# Patient Record
Sex: MALE | Race: AMERICAN INDIAN OR ALASKA NATIVE
[De-identification: names, ages, dates, MRNs, and addresses within clinical notes are randomized per-mention and may not be internally consistent; named-entity substitution may affect disease eponyms.]

---

## 2019-10-09 ENCOUNTER — HOSPITAL ENCOUNTER (INPATIENT)
Dept: HOSPITAL 5 - ED | Age: 29
LOS: 7 days | Discharge: HOME | DRG: 812 | End: 2019-10-16
Attending: INTERNAL MEDICINE | Admitting: INTERNAL MEDICINE
Payer: MEDICARE

## 2019-10-09 DIAGNOSIS — Z90.49: ICD-10-CM

## 2019-10-09 DIAGNOSIS — E86.0: ICD-10-CM

## 2019-10-09 DIAGNOSIS — D57.00: Primary | ICD-10-CM

## 2019-10-09 DIAGNOSIS — Z79.899: ICD-10-CM

## 2019-10-09 DIAGNOSIS — G89.29: ICD-10-CM

## 2019-10-09 DIAGNOSIS — R09.02: ICD-10-CM

## 2019-10-09 DIAGNOSIS — D64.9: ICD-10-CM

## 2019-10-09 DIAGNOSIS — Z86.718: ICD-10-CM

## 2019-10-09 DIAGNOSIS — J45.909: ICD-10-CM

## 2019-10-09 LAB
ANISOCYTOSIS BLD QL SMEAR: (no result)
BAND NEUTROPHILS # (MANUAL): 0 K/MM3
HCT VFR BLD CALC: 21.4 % (ref 35.5–45.6)
HGB BLD-MCNC: 7.8 GM/DL (ref 11.8–15.2)
HGB S BLD QL SOLY: (no result)
INR PPP: 1.11 (ref 0.87–1.13)
MACROCYTES BLD QL SMEAR: (no result)
MCHC RBC AUTO-ENTMCNC: 36 % (ref 32–34)
MCV RBC AUTO: 96 FL (ref 84–94)
MYELOCYTES # (MANUAL): 0 K/MM3
PLATELET # BLD: 197 K/MM3 (ref 140–440)
PROMYELOCYTES # (MANUAL): 0 K/MM3
RBC # BLD AUTO: 2.23 M/MM3 (ref 3.65–5.03)
TOTAL CELLS COUNTED BLD: 100

## 2019-10-09 PROCEDURE — 85007 BL SMEAR W/DIFF WBC COUNT: CPT

## 2019-10-09 PROCEDURE — 85660 RBC SICKLE CELL TEST: CPT

## 2019-10-09 PROCEDURE — 86850 RBC ANTIBODY SCREEN: CPT

## 2019-10-09 PROCEDURE — 94760 N-INVAS EAR/PLS OXIMETRY 1: CPT

## 2019-10-09 PROCEDURE — 85018 HEMOGLOBIN: CPT

## 2019-10-09 PROCEDURE — 86920 COMPATIBILITY TEST SPIN: CPT

## 2019-10-09 PROCEDURE — 36415 COLL VENOUS BLD VENIPUNCTURE: CPT

## 2019-10-09 PROCEDURE — 86901 BLOOD TYPING SEROLOGIC RH(D): CPT

## 2019-10-09 PROCEDURE — 85610 PROTHROMBIN TIME: CPT

## 2019-10-09 PROCEDURE — 85014 HEMATOCRIT: CPT

## 2019-10-09 PROCEDURE — 85045 AUTOMATED RETICULOCYTE COUNT: CPT

## 2019-10-09 PROCEDURE — 83615 LACTATE (LD) (LDH) ENZYME: CPT

## 2019-10-09 PROCEDURE — 83550 IRON BINDING TEST: CPT

## 2019-10-09 PROCEDURE — A6250 SKIN SEAL PROTECT MOISTURIZR: HCPCS

## 2019-10-09 PROCEDURE — G0378 HOSPITAL OBSERVATION PER HR: HCPCS

## 2019-10-09 PROCEDURE — 82728 ASSAY OF FERRITIN: CPT

## 2019-10-09 PROCEDURE — 86900 BLOOD TYPING SEROLOGIC ABO: CPT

## 2019-10-09 PROCEDURE — 87116 MYCOBACTERIA CULTURE: CPT

## 2019-10-09 PROCEDURE — 96374 THER/PROPH/DIAG INJ IV PUSH: CPT

## 2019-10-09 PROCEDURE — 85025 COMPLETE CBC W/AUTO DIFF WBC: CPT

## 2019-10-09 PROCEDURE — 80048 BASIC METABOLIC PNL TOTAL CA: CPT

## 2019-10-09 PROCEDURE — 82150 ASSAY OF AMYLASE: CPT

## 2019-10-09 PROCEDURE — P9016 RBC LEUKOCYTES REDUCED: HCPCS

## 2019-10-09 PROCEDURE — 83690 ASSAY OF LIPASE: CPT

## 2019-10-09 RX ADMIN — DEXTROSE AND SODIUM CHLORIDE SCH MLS/HR: 5; .2 INJECTION, SOLUTION INTRAVENOUS at 21:14

## 2019-10-09 RX ADMIN — DIPHENHYDRAMINE HYDROCHLORIDE PRN MG: 50 INJECTION, SOLUTION INTRAMUSCULAR; INTRAVENOUS at 21:20

## 2019-10-09 RX ADMIN — HYDROMORPHONE HYDROCHLORIDE SCH MG: 2 INJECTION, SOLUTION INTRAMUSCULAR; INTRAVENOUS; SUBCUTANEOUS at 21:18

## 2019-10-09 RX ADMIN — APIXABAN SCH MG: 2.5 TABLET, FILM COATED ORAL at 21:17

## 2019-10-09 RX ADMIN — KETOROLAC TROMETHAMINE SCH MG: 30 INJECTION, SOLUTION INTRAMUSCULAR at 21:19

## 2019-10-09 NOTE — EMERGENCY DEPARTMENT REPORT
ED General Adult HPI





- General


Chief complaint: Sickle Cell Crisis


Stated complaint: SICKLE CELL CRISIS


Time Seen by Provider: 10/09/19 12:39


Source: patient


Mode of arrival: Ambulatory


Limitations: No Limitations





- History of Present Illness


Initial comments: 





She presents to the emergency pole with a chief complaint of sickle cell crisis.

 Patient states he is having sharp back and leg pain is consistent with prior 

crises.  Patient denies chest pain, shortness breath, or headache.


-: Gradual


Severity scale (0 -10): 5


Quality: sharp


Consistency: constant


Improves with: rest


Worsens with: movement


Associated Symptoms: denies other symptoms


Treatments Prior to Arrival: none





- Related Data


                                Home Medications











 Medication  Instructions  Recorded  Confirmed  Last Taken


 


Folic Acid [Folvite] 1 mg PO DAILY 06/21/18 09/11/19 09/10/19


 


Morphine ER [Ms Contin ER] 30 mg PO BID 06/21/18 09/11/19 09/10/19


 


Zolpidem [Ambien] 5 mg PO QHS PRN 08/12/19 09/11/19 09/10/19








                                  Previous Rx's











 Medication  Instructions  Recorded  Last Taken  Type


 


Hydroxyurea [Droxia] 1,500 mg PO DAILY #30 capsule 02/24/18 09/10/19 Rx


 


Oxycodone HCl/Acetaminophen 1 tab PO Q4-6H PRN #6 tablet 05/05/19 09/10/19 Rx





[Percocet 10/325 mg]    


 


Apixaban [Eliquis] 2.5 mg PO BID #60 tablet 06/10/19 09/10/19 Rx











                                    Allergies











Allergy/AdvReac Type Severity Reaction Status Date / Time


 


No Known Allergies Allergy   Verified 08/12/19 14:45














ED Review of Systems


ROS: 


Stated complaint: SICKLE CELL CRISIS


Other details as noted in HPI





Comment: All other systems reviewed and negative


Constitutional: denies: chills, fever


Eyes: denies: eye pain, eye discharge, vision change


ENT: denies: ear pain, throat pain


Respiratory: denies: cough, shortness of breath, wheezing


Cardiovascular: denies: chest pain, palpitations


Endocrine: no symptoms reported


Gastrointestinal: denies: abdominal pain, nausea, diarrhea


Genitourinary: denies: urgency, dysuria


Musculoskeletal: denies: back pain, joint swelling, arthralgia


Skin: denies: rash, lesions


Neurological: denies: headache, weakness, paresthesias


Psychiatric: denies: anxiety, depression


Hematological/Lymphatic: denies: easy bleeding, easy bruising





ED Past Medical Hx





- Past Medical History


Previous Medical History?: Yes


Hx Hypertension: No


Hx CVA: No


Hx Heart Attack/AMI: No


Hx Congestive Heart Failure: No


Hx Diabetes: No


Hx Deep Vein Thrombosis: No


Hx Pulmonary Embolism: No


Hx GERD: No


Hx Liver Disease: No


Hx Renal Disease: No


Hx of Cancer: No


Hx Sickle Cell Disease: Yes


Hx Arthritis: No


Hx Headaches / Migraines: No


Hx Seizures: No


Hx Kidney Stones: No


Hx Psychiatric Treatment: No


Hx Asthma: Yes (denies hx exacerbations)


Hx COPD: No


Hx Tuberculosis: No


Hx Dementia: No


Hx HIV: No


Additional medical history: Acute chest syndrome,





- Surgical History


Past Surgical History?: Yes


Hx Coronary Stent: No


Hx Open Heart Surgery: No


Hx Pacemaker: No


Hx Internal Defibrillator: No


Hx Cholecystectomy: Yes


Hx Appendectomy: No


Hx Breast Surgery: No


Additional Surgical History: TRACHEOSTOMY.  2 PORTS AND REMOVAL.  LIVER BIOPSY 

X2





- Social History


Smoking Status: Never Smoker


Substance Use Type: None





- Medications


Home Medications: 


                                Home Medications











 Medication  Instructions  Recorded  Confirmed  Last Taken  Type


 


Hydroxyurea [Droxia] 1,500 mg PO DAILY #30 capsule 02/24/18 09/11/19 09/10/19 Rx


 


Folic Acid [Folvite] 1 mg PO DAILY 06/21/18 09/11/19 09/10/19 History


 


Morphine ER [Ms Contin ER] 30 mg PO BID 06/21/18 09/11/19 09/10/19 History


 


Oxycodone HCl/Acetaminophen 1 tab PO Q4-6H PRN #6 tablet 05/05/19 09/11/19 

09/10/19 Rx





[Percocet 10/325 mg]     


 


Apixaban [Eliquis] 2.5 mg PO BID #60 tablet 06/10/19 09/11/19 09/10/19 Rx


 


Zolpidem [Ambien] 5 mg PO QHS PRN 08/12/19 09/11/19 09/10/19 History














ED Physical Exam





- General


Limitations: No Limitations


General appearance: alert, in no apparent distress





- Head


Head exam: Present: atraumatic, normocephalic





- Eye


Eye exam: Present: PERRL, EOMI, scleral icterus





- ENT


ENT exam: Present: mucous membranes moist





- Neck


Neck exam: Present: normal inspection





- Respiratory


Respiratory exam: Present: normal lung sounds bilaterally.  Absent: respiratory 

distress





- Cardiovascular


Cardiovascular Exam: Present: regular rate, normal rhythm.  Absent: systolic 

murmur, diastolic murmur, rubs, gallop





- GI/Abdominal


GI/Abdominal exam: Present: soft, normal bowel sounds.  Absent: distended, 

tenderness





- Rectal


Rectal exam: Present: deferred





- Extremities Exam


Extremities exam: Present: normal inspection





- Back Exam


Back exam: Present: normal inspection





- Neurological Exam


Neurological exam: Present: alert, oriented X3, CN II-XII intact.  Absent: motor

 sensory deficit





- Psychiatric


Psychiatric exam: Present: normal affect, normal mood





- Skin


Skin exam: Present: warm, dry, intact, normal color.  Absent: rash





ED Course





                                   Vital Signs











  10/09/19 10/09/19 10/09/19





  12:22 12:52 15:59


 


Temperature 98.7 F  


 


Pulse Rate 102 H 96 H 89


 


Respiratory 16 16 16





Rate   


 


Blood Pressure 121/74  


 


Blood Pressure  121/70 102/57





[Left]   


 


O2 Sat by Pulse 90 100 96





Oximetry   














  10/09/19 10/09/19





  17:03 17:35


 


Temperature  


 


Pulse Rate 86 


 


Respiratory 20 





Rate  


 


Blood Pressure  


 


Blood Pressure 122/67 118/76





[Left]  


 


O2 Sat by Pulse 100 





Oximetry  














ED Medical Decision Making





- Lab Data


Result diagrams: 


                                 10/09/19 14:48











                                   Lab Results











  10/09/19 10/09/19 10/09/19 Range/Units





  14:48 14:48 14:48 


 


RBC  2.23 L    (3.65-5.03)  M/mm3


 


Hgb  7.8 L    (11.8-15.2)  gm/dl


 


Hct  21.4 L    (35.5-45.6)  %


 


MCV  96 H    (84-94)  fl


 


MCH  35 H    (28-32)  pg


 


MCHC  36 H    (32-34)  %


 


RDW  31.0 H    (13.2-15.2)  %


 


Percent Retic  17.52 H    (0.78-2.58)  %


 


PT    14.0  (12.2-14.9)  Sec.


 


INR    1.11  (0.87-1.13)  


 


Lactate Dehydrogenase     ()  units/L


 


Amylase   67   ()  units/L


 


Lipase   24   (13-60)  units/L


 


Blood Type     


 


Antibody Screen     














  10/09/19 10/09/19 Range/Units





  14:48 14:50 


 


RBC    (3.65-5.03)  M/mm3


 


Hgb    (11.8-15.2)  gm/dl


 


Hct    (35.5-45.6)  %


 


MCV    (84-94)  fl


 


MCH    (28-32)  pg


 


MCHC    (32-34)  %


 


RDW    (13.2-15.2)  %


 


Percent Retic    (0.78-2.58)  %


 


PT    (12.2-14.9)  Sec.


 


INR    (0.87-1.13)  


 


Lactate Dehydrogenase  1434 H   ()  units/L


 


Amylase    ()  units/L


 


Lipase    (13-60)  units/L


 


Blood Type   A POSITIVE  


 


Antibody Screen   Negative  














- Medical Decision Making





Patient received IV Dilaudid and IV fluids


Critical care attestation.: 


If time is entered above; I have spent that time in minutes in the direct care 

of this critically ill patient, excluding procedure time.








ED Disposition


Clinical Impression: 


 Sickle cell crisis





Disposition: DC-09 OP ADMIT IP TO THIS HOSP


Is pt being admited?: Yes


Does the pt Need Aspirin: No


Condition: Fair


Referrals: 


DARLENE KIM DO [Primary Care Provider] - 3-5 Days

## 2019-10-09 NOTE — HISTORY AND PHYSICAL REPORT
History of Present Illness


Date of examination: 10/09/19


Date of admission: 


t/10/9/19


Chief complaint: 


SCD/Pain crisis





History of present illness: 


Patient presented to the Er with CC of SCD/Pain crisis, not controlled by his 

home meds. he was evaluated, in the ED, and admitted for sxs 

management/control.He will get pain . hydration, oxygen, and monitor labs.








Past History


Past Medical History: anemia, DVT


Social history: no significant social history, single, lives with family


Family history: no significant family history





Medications and Allergies


                                    Allergies











Allergy/AdvReac Type Severity Reaction Status Date / Time


 


No Known Allergies Allergy   Verified 08/12/19 14:45











                                Home Medications











 Medication  Instructions  Recorded  Confirmed  Last Taken  Type


 


Hydroxyurea [Droxia] 1,500 mg PO DAILY #30 capsule 02/24/18 09/11/19 09/10/19 Rx


 


Folic Acid [Folvite] 1 mg PO DAILY 06/21/18 09/11/19 09/10/19 History


 


Morphine ER [Ms Contin ER] 30 mg PO BID 06/21/18 09/11/19 09/10/19 History


 


Oxycodone HCl/Acetaminophen 1 tab PO Q4-6H PRN #6 tablet 05/05/19 09/11/19 

09/10/19 Rx





[Percocet 10/325 mg]     


 


Apixaban [Eliquis] 2.5 mg PO BID #60 tablet 06/10/19 09/11/19 09/10/19 Rx


 


Zolpidem [Ambien] 5 mg PO QHS PRN 08/12/19 09/11/19 09/10/19 History











Active Meds: 


Active Medications





Apixaban (Eliquis)  2.5 mg PO BID DULCE; Protocol


Diphenhydramine HCl (Benadryl)  25 mg IV Q6HR ONE


   Stop: 10/09/19 18:42


Folic Acid (Folvite)  1 mg PO DAILY DULCE


Hydromorphone HCl (Dilaudid)  2 mg IV Q3HR DULCE


Sodium Chloride (Nacl 0.9% 1000 Ml)  1,000 mls @ 150 mls/hr IV AS DIRECT DULCE


Dextrose/Sodium Chloride (D5ns 0.2%)  1,000 mls @ 150 mls/hr IV AS DIRECT DULCE


Ketorolac Tromethamine (Toradol)  30 mg IV Q6HR ONE


   Stop: 10/09/19 18:44


Miscellaneous Medication (Hydroxyurea [Droxia])  1,500 mg PO DAILY DULCE


Miscellaneous Medication (Oxycodone Hcl/Acetaminophen [Percocet 10/325 Mg])  1 

tab PO Q4-6H PRN


   PRN Reason: PAIN


Ondansetron HCl (Zofran)  4 mg IV Q8HR ONE


   Stop: 10/09/19 18:45


Zolpidem Tartrate (Ambien)  5 mg PO QHS PRN


   PRN Reason: Sleep











Review of Systems


Constitutional: fatigue, chronic pain


Respiratory: shortness of breath, home oxygen





Exam





- Constitutional


Vitals: 


                                        











Temp Pulse Resp BP Pulse Ox


 


 98.7 F   86   20   118/76   100 


 


 10/09/19 12:22  10/09/19 17:03  10/09/19 17:03  10/09/19 17:35  10/09/19 17:03











General appearance: Present: mild distress, well-nourished





- EENT


Eyes: Present: PERRL


ENT: hearing intact, clear oral mucosa





- Neck


Neck: Present: supple, normal ROM





- Respiratory


Respiratory: bilateral: diminished





- Cardiovascular


Heart Sounds: Present: S1 & S2.  Absent: rub, click





- Extremities


Extremities: pulses symmetrical, No edema


Peripheral Pulses: within normal limits





- Abdominal


General gastrointestinal: Present: soft, non-tender, non-distended, normal bowel

sounds


Male genitourinary: Present: deferred





- Rectal


Rectal Exam: deferred





- Integumentary


Integumentary: Present: clear, warm, dry





- Musculoskeletal


Musculoskeletal: gait normal, strength equal bilaterally





- Psychiatric


Psychiatric: appropriate mood/affect, intact judgment & insight





- Neurologic


Neurologic: CNII-XII intact, moves all extremities





Results





- Labs


CBC & Chem 7: 


                                 10/09/19 14:48





Labs: 


                              Abnormal lab results











  10/09/19 10/09/19 Range/Units





  14:48 14:48 


 


RBC  2.23 L   (3.65-5.03)  M/mm3


 


Hgb  7.8 L   (11.8-15.2)  gm/dl


 


Hct  21.4 L   (35.5-45.6)  %


 


MCV  96 H   (84-94)  fl


 


MCH  35 H   (28-32)  pg


 


MCHC  36 H   (32-34)  %


 


RDW  31.0 H   (13.2-15.2)  %


 


Monocytes % (Manual)  13.0 H   (0.0-7.3)  %


 


Nucleated RBC %  34.0 H   (0.0-0.9)  %


 


Seg Neutrophils # Man  0.0 L   (1.8-7.7)  K/mm3


 


Lymphocytes # (Manual)  0.0 L   (1.2-5.4)  K/mm3


 


Percent Retic  17.52 H   (0.78-2.58)  %


 


Lactate Dehydrogenase   1434 H  ()  units/L














Assessment and Plan





- Patient Problems


(1) Sickle cell anemia with pain


Current Visit: Yes   Status: Chronic   


Plan to address problem: 


see orders.








(2) Hemolytic crisis


Current Visit: No   Status: Acute   


Plan to address problem: 


supportive., and follow labs.








(3) Anemia


Current Visit: Yes   Status: Acute   


Plan to address problem: 


monitor, adjust labs.








(4) Dehydration


Current Visit: Yes   Status: Acute   


Plan to address problem: 


hydration.








(5) Hypoxemia


Current Visit: Yes   Status: Chronic   


Plan to address problem: 


oxygen

## 2019-10-09 NOTE — EVENT NOTE
ED Screening Note


Date of service: 10/09/19


Time: 12:39


ED Screening Note: 





28 y/o male comes in for SCD crisis.  Having abd pain and jaundice.  








This initial assessment/diagnostic orders/clinical plan/treatment(s) is/are 

subject to change based on patients health status, clinical progression and re-

assessment by fellow clinical providers in the ED. Further treatment and workup 

at subsequent clinical providers discretion. Patient/guardian urged not to elope

from the ED as their condition may be serious if not clinically assessed and 

managed. 





Initial orders include:

## 2019-10-10 LAB
ANISOCYTOSIS BLD QL SMEAR: (no result)
BAND NEUTROPHILS # (MANUAL): 0 K/MM3
BUN SERPL-MCNC: 9 MG/DL (ref 9–20)
BUN/CREAT SERPL: 13 %
CALCIUM SERPL-MCNC: 8 MG/DL (ref 8.4–10.2)
HCT VFR BLD CALC: 20.6 % (ref 35.5–45.6)
HEMOLYSIS INDEX: 16
HGB BLD-MCNC: 7.6 GM/DL (ref 11.8–15.2)
HGB S BLD QL SOLY: (no result)
IRON SERPL-MCNC: 141 UG/DL (ref 49–181)
MACROCYTES BLD QL SMEAR: (no result)
MCHC RBC AUTO-ENTMCNC: 37 % (ref 32–34)
MCV RBC AUTO: 96 FL (ref 84–94)
MYELOCYTES # (MANUAL): 0 K/MM3
PLATELET # BLD: 178 K/MM3 (ref 140–440)
PROMYELOCYTES # (MANUAL): 0 K/MM3
RBC # BLD AUTO: 2.16 M/MM3 (ref 3.65–5.03)
TIBC SERPL-MCNC: 167 MCG/DL (ref 250–450)
TOTAL CELLS COUNTED BLD: 100

## 2019-10-10 RX ADMIN — HYDROMORPHONE HYDROCHLORIDE SCH MG: 2 INJECTION, SOLUTION INTRAMUSCULAR; INTRAVENOUS; SUBCUTANEOUS at 15:46

## 2019-10-10 RX ADMIN — HYDROXYUREA SCH MG: 500 CAPSULE ORAL at 10:51

## 2019-10-10 RX ADMIN — HYDROMORPHONE HYDROCHLORIDE SCH MG: 2 INJECTION, SOLUTION INTRAMUSCULAR; INTRAVENOUS; SUBCUTANEOUS at 18:10

## 2019-10-10 RX ADMIN — FOLIC ACID SCH MG: 1 TABLET ORAL at 10:03

## 2019-10-10 RX ADMIN — HYDROMORPHONE HYDROCHLORIDE SCH MG: 2 INJECTION, SOLUTION INTRAMUSCULAR; INTRAVENOUS; SUBCUTANEOUS at 03:52

## 2019-10-10 RX ADMIN — HYDROMORPHONE HYDROCHLORIDE SCH MG: 2 INJECTION, SOLUTION INTRAMUSCULAR; INTRAVENOUS; SUBCUTANEOUS at 10:00

## 2019-10-10 RX ADMIN — DIPHENHYDRAMINE HYDROCHLORIDE PRN MG: 50 INJECTION, SOLUTION INTRAMUSCULAR; INTRAVENOUS at 10:09

## 2019-10-10 RX ADMIN — HYDROMORPHONE HYDROCHLORIDE SCH MG: 2 INJECTION, SOLUTION INTRAMUSCULAR; INTRAVENOUS; SUBCUTANEOUS at 06:42

## 2019-10-10 RX ADMIN — KETOROLAC TROMETHAMINE SCH: 30 INJECTION, SOLUTION INTRAMUSCULAR at 00:58

## 2019-10-10 RX ADMIN — DIPHENHYDRAMINE HYDROCHLORIDE PRN MG: 50 INJECTION, SOLUTION INTRAMUSCULAR; INTRAVENOUS at 03:55

## 2019-10-10 RX ADMIN — APIXABAN SCH MG: 2.5 TABLET, FILM COATED ORAL at 10:03

## 2019-10-10 RX ADMIN — DEXTROSE AND SODIUM CHLORIDE SCH MLS/HR: 5; .2 INJECTION, SOLUTION INTRAVENOUS at 03:52

## 2019-10-10 RX ADMIN — KETOROLAC TROMETHAMINE SCH MG: 30 INJECTION, SOLUTION INTRAMUSCULAR at 06:42

## 2019-10-10 RX ADMIN — HYDROMORPHONE HYDROCHLORIDE SCH MG: 2 INJECTION, SOLUTION INTRAMUSCULAR; INTRAVENOUS; SUBCUTANEOUS at 12:38

## 2019-10-10 RX ADMIN — HYDROMORPHONE HYDROCHLORIDE SCH MG: 2 INJECTION, SOLUTION INTRAMUSCULAR; INTRAVENOUS; SUBCUTANEOUS at 00:53

## 2019-10-10 RX ADMIN — DEXTROSE AND SODIUM CHLORIDE SCH MLS/HR: 5; .2 INJECTION, SOLUTION INTRAVENOUS at 10:12

## 2019-10-10 RX ADMIN — HYDROMORPHONE HYDROCHLORIDE SCH MG: 2 INJECTION, SOLUTION INTRAMUSCULAR; INTRAVENOUS; SUBCUTANEOUS at 21:43

## 2019-10-10 RX ADMIN — DIPHENHYDRAMINE HYDROCHLORIDE PRN MG: 50 INJECTION, SOLUTION INTRAMUSCULAR; INTRAVENOUS at 15:46

## 2019-10-10 RX ADMIN — DEXTROSE AND SODIUM CHLORIDE SCH MLS/HR: 5; .2 INJECTION, SOLUTION INTRAVENOUS at 17:03

## 2019-10-10 RX ADMIN — APIXABAN SCH MG: 2.5 TABLET, FILM COATED ORAL at 21:44

## 2019-10-10 RX ADMIN — DIPHENHYDRAMINE HYDROCHLORIDE PRN MG: 50 INJECTION, SOLUTION INTRAMUSCULAR; INTRAVENOUS at 21:44

## 2019-10-10 NOTE — PROGRESS NOTE
Assessment and Plan





- Patient Problems


(1) Sickle cell anemia with pain


Current Visit: Yes   Status: Chronic   


Plan to address problem: 


see orders.








(2) Hemolytic crisis


Current Visit: No   Status: Acute   


Plan to address problem: 


supportive., and follow labs.








(3) Anemia


Current Visit: Yes   Status: Acute   


Plan to address problem: 


monitor, adjust labs.








(4) Dehydration


Current Visit: Yes   Status: Acute   


Plan to address problem: 


hydration.








(5) Hypoxemia


Current Visit: Yes   Status: Chronic   


Plan to address problem: 


oxygen








Subjective


Date of service: 10/10/19


Principal diagnosis: SCD/Pain crisis, Anemia with hemolysis.


Interval history: 


Patient seen/examined, resting in bed, labs reviewed, case d/w patient. No new 

issues at this time. will continue with current management.








Objective





- Constitutional


Vitals: 


                               Vital Signs - 12hr











  10/10/19 10/10/19 10/10/19





  09:22 13:30 16:38


 


Temperature  97.8 F 97.6 F


 


Pulse Rate  68 68


 


Respiratory  24 20





Rate   


 


Blood Pressure  113/61 105/66


 


O2 Sat by Pulse 96 95 93





Oximetry   











General appearance: Present: mild distress, well-nourished





- EENT


Eyes: PERRL, EOM intact


ENT: hearing intact, clear oral mucosa


Ears: bilateral: normal





- Neck


Neck: supple, normal ROM





- Respiratory


Respiratory effort: normal


Respiratory: bilateral: CTA





- Breasts


Breasts: deferred





- Cardiovascular


Rhythm: regular


Heart Sounds: Present: S1 & S2.  Absent: gallop, rub


Extremities: pulses intact, No edema, normal color, Full ROM





- Gastrointestinal


General gastrointestinal: Present: soft, non-tender, non-distended, normal bowel

sounds


Rectal Exam: deferred





- Genitourinary


Male genitourinary: deferred





- Integumentary


Integumentary: clear, warm, dry





- Musculoskeletal


Musculoskeletal: 1, strength equal bilaterally





- Neurologic


Neurologic: moves all extremities





- Psychiatric


Psychiatric: memory intact, appropriate mood/affect, intact judgment & insight





- Labs


CBC & Chem 7: 


                                 10/10/19 07:00





                                 10/10/19 07:00


Labs: 


                              Abnormal lab results











  10/09/19 10/10/19 10/10/19 Range/Units





  14:42 07:00 07:00 


 


RBC   2.16 L   (3.65-5.03)  M/mm3


 


Hgb   7.6 L   (11.8-15.2)  gm/dl


 


Hct   20.6 L   (35.5-45.6)  %


 


MCV   96 H   (84-94)  fl


 


MCH   35 H   (28-32)  pg


 


MCHC   37 H   (32-34)  %


 


RDW   28.4 H   (13.2-15.2)  %


 


Seg Neuts % (Manual)   37.0 L   (40.0-70.0)  %


 


Lymphocytes % (Manual)   38.0 H   (13.4-35.0)  %


 


Monocytes % (Manual)   21.0 H   (0.0-7.3)  %


 


Nucleated RBC %   39.0 H   (0.0-0.9)  %


 


Seg Neutrophils # Man   0.0 L   (1.8-7.7)  K/mm3


 


Lymphocytes # (Manual)   0.0 L   (1.2-5.4)  K/mm3


 


Percent Retic   15.92 H   (0.78-2.58)  %


 


Sodium    135 L  (137-145)  mmol/L


 


Carbon Dioxide    19 L  (22-30)  mmol/L


 


Creatinine    0.7 L  (0.8-1.5)  mg/dL


 


Glucose    145 H  ()  mg/dL


 


Calcium    8.0 L  (8.4-10.2)  mg/dL


 


TIBC    167 L  (250-450)  mcg/dL


 


Ferritin  611.7 H    (13.0-400.0)  ng/mL

## 2019-10-11 RX ADMIN — HYDROMORPHONE HYDROCHLORIDE SCH MG: 2 INJECTION, SOLUTION INTRAMUSCULAR; INTRAVENOUS; SUBCUTANEOUS at 15:18

## 2019-10-11 RX ADMIN — DIPHENHYDRAMINE HYDROCHLORIDE PRN MG: 50 INJECTION, SOLUTION INTRAMUSCULAR; INTRAVENOUS at 03:40

## 2019-10-11 RX ADMIN — DEXTROSE AND SODIUM CHLORIDE SCH MLS/HR: 5; .2 INJECTION, SOLUTION INTRAVENOUS at 14:28

## 2019-10-11 RX ADMIN — DIPHENHYDRAMINE HYDROCHLORIDE PRN MG: 50 INJECTION, SOLUTION INTRAMUSCULAR; INTRAVENOUS at 18:17

## 2019-10-11 RX ADMIN — HYDROMORPHONE HYDROCHLORIDE SCH MG: 2 INJECTION, SOLUTION INTRAMUSCULAR; INTRAVENOUS; SUBCUTANEOUS at 03:37

## 2019-10-11 RX ADMIN — HYDROMORPHONE HYDROCHLORIDE SCH MG: 2 INJECTION, SOLUTION INTRAMUSCULAR; INTRAVENOUS; SUBCUTANEOUS at 06:39

## 2019-10-11 RX ADMIN — DEXTROSE AND SODIUM CHLORIDE SCH MLS/HR: 5; .2 INJECTION, SOLUTION INTRAVENOUS at 00:42

## 2019-10-11 RX ADMIN — HYDROMORPHONE HYDROCHLORIDE SCH MG: 2 INJECTION, SOLUTION INTRAMUSCULAR; INTRAVENOUS; SUBCUTANEOUS at 12:13

## 2019-10-11 RX ADMIN — FOLIC ACID SCH MG: 1 TABLET ORAL at 10:20

## 2019-10-11 RX ADMIN — HYDROMORPHONE HYDROCHLORIDE SCH MG: 2 INJECTION, SOLUTION INTRAMUSCULAR; INTRAVENOUS; SUBCUTANEOUS at 18:13

## 2019-10-11 RX ADMIN — DEXTROSE AND SODIUM CHLORIDE SCH MLS/HR: 5; .2 INJECTION, SOLUTION INTRAVENOUS at 21:14

## 2019-10-11 RX ADMIN — HYDROMORPHONE HYDROCHLORIDE SCH MG: 2 INJECTION, SOLUTION INTRAMUSCULAR; INTRAVENOUS; SUBCUTANEOUS at 09:02

## 2019-10-11 RX ADMIN — DEXTROSE AND SODIUM CHLORIDE SCH MLS/HR: 5; .2 INJECTION, SOLUTION INTRAVENOUS at 06:39

## 2019-10-11 RX ADMIN — HYDROMORPHONE HYDROCHLORIDE SCH MG: 2 INJECTION, SOLUTION INTRAMUSCULAR; INTRAVENOUS; SUBCUTANEOUS at 00:41

## 2019-10-11 RX ADMIN — HYDROXYUREA SCH MG: 500 CAPSULE ORAL at 10:19

## 2019-10-11 RX ADMIN — DIPHENHYDRAMINE HYDROCHLORIDE PRN MG: 50 INJECTION, SOLUTION INTRAMUSCULAR; INTRAVENOUS at 12:13

## 2019-10-11 RX ADMIN — APIXABAN SCH MG: 2.5 TABLET, FILM COATED ORAL at 21:13

## 2019-10-11 RX ADMIN — APIXABAN SCH MG: 2.5 TABLET, FILM COATED ORAL at 10:19

## 2019-10-11 RX ADMIN — HYDROMORPHONE HYDROCHLORIDE SCH MG: 2 INJECTION, SOLUTION INTRAMUSCULAR; INTRAVENOUS; SUBCUTANEOUS at 21:13

## 2019-10-11 NOTE — PROGRESS NOTE
Assessment and Plan





- Patient Problems


(1) Sickle cell anemia with pain


Current Visit: Yes   Status: Chronic   


Plan to address problem: 


see orders.








(2) Hemolytic crisis


Current Visit: No   Status: Acute   


Plan to address problem: 


supportive., and follow labs.








(3) Anemia


Current Visit: Yes   Status: Acute   


Plan to address problem: 


monitor, adjust labs.








(4) Dehydration


Current Visit: Yes   Status: Acute   


Plan to address problem: 


hydration.








(5) Hypoxemia


Current Visit: Yes   Status: Chronic   


Plan to address problem: 


oxygen








Subjective


Date of service: 10/11/19


Principal diagnosis: SCD/Pain crisis, Anemia with hemolysis.


Interval history: 


Patient seen/examined, resting in bed, labs reviewed, case d/w patient. No new 

issues at this time. will continue with current management.


Patient seen/examined, resting in bed, labs reviewed,  case d/w patient. Will 

continue with current management.





Objective





- Constitutional


Vitals: 


                               Vital Signs - 12hr











  10/10/19 10/11/19 10/11/19





  22:00 00:13 06:07


 


Temperature  98.3 F 97.7 F


 


Pulse Rate  74 64


 


Respiratory  16 16





Rate   


 


Blood Pressure  99/68 106/57


 


O2 Sat by Pulse 97 95 98





Oximetry   














  10/11/19





  09:17


 


Temperature 


 


Pulse Rate 


 


Respiratory 





Rate 


 


Blood Pressure 


 


O2 Sat by Pulse 100





Oximetry 











General appearance: Present: mild distress, well-nourished





- EENT


Eyes: PERRL, EOM intact


ENT: hearing intact, clear oral mucosa


Ears: bilateral: normal





- Neck


Neck: supple, normal ROM





- Respiratory


Respiratory: bilateral: diminished





- Breasts


Breasts: deferred





- Cardiovascular


Rhythm: regular


Heart Sounds: Present: S1 & S2.  Absent: gallop, rub


Extremities: pulses intact, No edema, normal color, Full ROM





- Gastrointestinal


General gastrointestinal: Present: soft, non-tender, non-distended, normal bowel

sounds


Rectal Exam: deferred





- Genitourinary


Male genitourinary: deferred





- Integumentary


Integumentary: clear, warm, dry





- Musculoskeletal


Musculoskeletal: 1, strength equal bilaterally





- Neurologic


Neurologic: moves all extremities





- Psychiatric


Psychiatric: memory intact, appropriate mood/affect, intact judgment & insight





- Labs


CBC & Chem 7: 


                                 10/10/19 07:00





                                 10/10/19 07:00


Labs: 


                              Abnormal lab results











  10/10/19 10/11/19 Range/Units





  07:00 07:00 


 


Seg Neuts % (Manual)  37.0 L   (40.0-70.0)  %


 


Lymphocytes % (Manual)  38.0 H   (13.4-35.0)  %


 


Monocytes % (Manual)  21.0 H   (0.0-7.3)  %


 


Nucleated RBC %  39.0 H   (0.0-0.9)  %


 


Seg Neutrophils # Man  0.0 L   (1.8-7.7)  K/mm3


 


Lymphocytes # (Manual)  0.0 L   (1.2-5.4)  K/mm3


 


Ferritin   556.7 H  (13.0-400.0)  ng/mL

## 2019-10-12 RX ADMIN — DEXTROSE AND SODIUM CHLORIDE SCH MLS/HR: 5; .2 INJECTION, SOLUTION INTRAVENOUS at 03:05

## 2019-10-12 RX ADMIN — HYDROMORPHONE HYDROCHLORIDE SCH MG: 2 INJECTION, SOLUTION INTRAMUSCULAR; INTRAVENOUS; SUBCUTANEOUS at 15:52

## 2019-10-12 RX ADMIN — HYDROMORPHONE HYDROCHLORIDE SCH MG: 2 INJECTION, SOLUTION INTRAMUSCULAR; INTRAVENOUS; SUBCUTANEOUS at 00:15

## 2019-10-12 RX ADMIN — HYDROXYUREA SCH MG: 500 CAPSULE ORAL at 09:13

## 2019-10-12 RX ADMIN — HYDROMORPHONE HYDROCHLORIDE SCH MG: 2 INJECTION, SOLUTION INTRAMUSCULAR; INTRAVENOUS; SUBCUTANEOUS at 09:07

## 2019-10-12 RX ADMIN — HYDROMORPHONE HYDROCHLORIDE SCH MG: 2 INJECTION, SOLUTION INTRAMUSCULAR; INTRAVENOUS; SUBCUTANEOUS at 21:30

## 2019-10-12 RX ADMIN — HYDROMORPHONE HYDROCHLORIDE SCH MG: 2 INJECTION, SOLUTION INTRAMUSCULAR; INTRAVENOUS; SUBCUTANEOUS at 18:46

## 2019-10-12 RX ADMIN — DIPHENHYDRAMINE HYDROCHLORIDE PRN MG: 50 INJECTION, SOLUTION INTRAMUSCULAR; INTRAVENOUS at 05:44

## 2019-10-12 RX ADMIN — DEXTROSE AND SODIUM CHLORIDE SCH MLS/HR: 5; .2 INJECTION, SOLUTION INTRAVENOUS at 18:52

## 2019-10-12 RX ADMIN — DIPHENHYDRAMINE HYDROCHLORIDE PRN MG: 50 INJECTION, SOLUTION INTRAMUSCULAR; INTRAVENOUS at 00:19

## 2019-10-12 RX ADMIN — HYDROMORPHONE HYDROCHLORIDE SCH MG: 2 INJECTION, SOLUTION INTRAMUSCULAR; INTRAVENOUS; SUBCUTANEOUS at 03:05

## 2019-10-12 RX ADMIN — HYDROMORPHONE HYDROCHLORIDE SCH MG: 2 INJECTION, SOLUTION INTRAMUSCULAR; INTRAVENOUS; SUBCUTANEOUS at 05:43

## 2019-10-12 RX ADMIN — APIXABAN SCH MG: 2.5 TABLET, FILM COATED ORAL at 21:29

## 2019-10-12 RX ADMIN — APIXABAN SCH MG: 2.5 TABLET, FILM COATED ORAL at 09:12

## 2019-10-12 RX ADMIN — DIPHENHYDRAMINE HYDROCHLORIDE PRN MG: 50 INJECTION, SOLUTION INTRAMUSCULAR; INTRAVENOUS at 18:47

## 2019-10-12 RX ADMIN — DIPHENHYDRAMINE HYDROCHLORIDE PRN MG: 50 INJECTION, SOLUTION INTRAMUSCULAR; INTRAVENOUS at 12:33

## 2019-10-12 RX ADMIN — HYDROMORPHONE HYDROCHLORIDE SCH MG: 2 INJECTION, SOLUTION INTRAMUSCULAR; INTRAVENOUS; SUBCUTANEOUS at 12:27

## 2019-10-12 RX ADMIN — FOLIC ACID SCH MG: 1 TABLET ORAL at 09:12

## 2019-10-12 NOTE — PROGRESS NOTE
Assessment and Plan





- Patient Problems


(1) Sickle cell anemia with pain


Current Visit: Yes   Status: Chronic   


Plan to address problem: 


see orders.








(2) Hemolytic crisis


Current Visit: No   Status: Acute   


Plan to address problem: 


supportive., and follow labs.








(3) Anemia


Current Visit: Yes   Status: Acute   


Plan to address problem: 


monitor, adjust labs.








(4) Dehydration


Current Visit: Yes   Status: Acute   


Plan to address problem: 


hydration.








(5) Hypoxemia


Current Visit: Yes   Status: Chronic   


Plan to address problem: 


oxygen








Subjective


Date of service: 10/12/19


Principal diagnosis: SCD/Pain crisis, Anemia with hemolysis.


Interval history: 


Patient seen/examined, resting in bed, labs reviewed, case d/w patient. No new 

issues at this time. will continue with current management.


Patient seen/examined, resting in bed, labs reviewed,  case d/w patient. Will 

continue with current management.


Patient seen/examined, resting in bed, labs reviewed, case d/w patient.Will 

continue to manage pain, and start d/c plans .





Objective





- Constitutional


Vitals: 


                               Vital Signs - 12hr











  10/12/19 10/12/19 10/12/19





  04:25 04:34 08:54


 


Temperature 97.9 F 98.2 F 


 


Pulse Rate 78 81 


 


Respiratory 17 17 





Rate   


 


Blood Pressure 105/68 113/69 


 


O2 Sat by Pulse 94 98 98





Oximetry   














  10/12/19





  12:06


 


Temperature 98.2 F


 


Pulse Rate 75


 


Respiratory 18





Rate 


 


Blood Pressure 121/64


 


O2 Sat by Pulse 94





Oximetry 











General appearance: Present: mild distress, well-nourished





- EENT


Eyes: PERRL, EOM intact


ENT: hearing intact, clear oral mucosa


Ears: bilateral: normal





- Neck


Neck: supple, normal ROM





- Respiratory


Respiratory: bilateral: diminished





- Breasts


Breasts: deferred





- Cardiovascular


Rhythm: regular


Heart Sounds: Present: S1 & S2.  Absent: gallop, rub


Extremities: pulses intact, No edema, normal color, Full ROM





- Gastrointestinal


General gastrointestinal: Present: soft, non-tender, non-distended, normal bowel

sounds


Rectal Exam: deferred





- Genitourinary


Male genitourinary: deferred





- Integumentary


Integumentary: clear, warm, dry





- Musculoskeletal


Musculoskeletal: 1, strength equal bilaterally





- Neurologic


Neurologic: moves all extremities





- Psychiatric


Psychiatric: memory intact, appropriate mood/affect, intact judgment & insight





- Labs


CBC & Chem 7: 


                                 10/10/19 07:00





                                 10/10/19 07:00

## 2019-10-13 LAB
BAND NEUTROPHILS # (MANUAL): 0 K/MM3
BUN SERPL-MCNC: 10 MG/DL (ref 9–20)
BUN/CREAT SERPL: 25 %
CALCIUM SERPL-MCNC: 8.5 MG/DL (ref 8.4–10.2)
HCT VFR BLD CALC: 18.8 % (ref 35.5–45.6)
HEMOLYSIS INDEX: 24
HGB BLD-MCNC: 6.9 GM/DL (ref 11.8–15.2)
HGB S BLD QL SOLY: (no result)
MCHC RBC AUTO-ENTMCNC: 37 % (ref 32–34)
MCV RBC AUTO: 92 FL (ref 84–94)
MYELOCYTES # (MANUAL): 0 K/MM3
PLATELET # BLD: 187 K/MM3 (ref 140–440)
PROMYELOCYTES # (MANUAL): 0 K/MM3
RBC # BLD AUTO: 2.05 M/MM3 (ref 3.65–5.03)
TARGETS BLD QL SMEAR: (no result)
TOTAL CELLS COUNTED BLD: 100

## 2019-10-13 PROCEDURE — 30233N1 TRANSFUSION OF NONAUTOLOGOUS RED BLOOD CELLS INTO PERIPHERAL VEIN, PERCUTANEOUS APPROACH: ICD-10-PCS | Performed by: INTERNAL MEDICINE

## 2019-10-13 RX ADMIN — DEXTROSE AND SODIUM CHLORIDE SCH MLS/HR: 5; .2 INJECTION, SOLUTION INTRAVENOUS at 00:46

## 2019-10-13 RX ADMIN — HYDROMORPHONE HYDROCHLORIDE SCH MG: 2 INJECTION, SOLUTION INTRAMUSCULAR; INTRAVENOUS; SUBCUTANEOUS at 21:27

## 2019-10-13 RX ADMIN — HYDROMORPHONE HYDROCHLORIDE SCH MG: 2 INJECTION, SOLUTION INTRAMUSCULAR; INTRAVENOUS; SUBCUTANEOUS at 12:39

## 2019-10-13 RX ADMIN — HYDROMORPHONE HYDROCHLORIDE SCH MG: 2 INJECTION, SOLUTION INTRAMUSCULAR; INTRAVENOUS; SUBCUTANEOUS at 15:27

## 2019-10-13 RX ADMIN — HYDROMORPHONE HYDROCHLORIDE SCH MG: 2 INJECTION, SOLUTION INTRAMUSCULAR; INTRAVENOUS; SUBCUTANEOUS at 03:11

## 2019-10-13 RX ADMIN — DIPHENHYDRAMINE HYDROCHLORIDE PRN MG: 50 INJECTION, SOLUTION INTRAMUSCULAR; INTRAVENOUS at 06:27

## 2019-10-13 RX ADMIN — FOLIC ACID SCH MG: 1 TABLET ORAL at 09:42

## 2019-10-13 RX ADMIN — HYDROXYUREA SCH MG: 500 CAPSULE ORAL at 09:43

## 2019-10-13 RX ADMIN — HYDROMORPHONE HYDROCHLORIDE SCH MG: 2 INJECTION, SOLUTION INTRAMUSCULAR; INTRAVENOUS; SUBCUTANEOUS at 18:57

## 2019-10-13 RX ADMIN — APIXABAN SCH MG: 2.5 TABLET, FILM COATED ORAL at 09:42

## 2019-10-13 RX ADMIN — HYDROMORPHONE HYDROCHLORIDE SCH MG: 2 INJECTION, SOLUTION INTRAMUSCULAR; INTRAVENOUS; SUBCUTANEOUS at 06:28

## 2019-10-13 RX ADMIN — DIPHENHYDRAMINE HYDROCHLORIDE PRN MG: 50 INJECTION, SOLUTION INTRAMUSCULAR; INTRAVENOUS at 00:46

## 2019-10-13 RX ADMIN — DIPHENHYDRAMINE HYDROCHLORIDE PRN MG: 50 INJECTION, SOLUTION INTRAMUSCULAR; INTRAVENOUS at 12:40

## 2019-10-13 RX ADMIN — APIXABAN SCH MG: 2.5 TABLET, FILM COATED ORAL at 21:27

## 2019-10-13 RX ADMIN — HYDROMORPHONE HYDROCHLORIDE SCH MG: 2 INJECTION, SOLUTION INTRAMUSCULAR; INTRAVENOUS; SUBCUTANEOUS at 09:33

## 2019-10-13 RX ADMIN — DEXTROSE AND SODIUM CHLORIDE SCH MLS/HR: 5; .2 INJECTION, SOLUTION INTRAVENOUS at 09:40

## 2019-10-13 RX ADMIN — DIPHENHYDRAMINE HYDROCHLORIDE PRN MG: 50 INJECTION, SOLUTION INTRAMUSCULAR; INTRAVENOUS at 18:58

## 2019-10-13 RX ADMIN — HYDROMORPHONE HYDROCHLORIDE SCH MG: 2 INJECTION, SOLUTION INTRAMUSCULAR; INTRAVENOUS; SUBCUTANEOUS at 00:34

## 2019-10-13 NOTE — PROGRESS NOTE
Assessment and Plan





- Patient Problems


(1) Sickle cell anemia with pain


Current Visit: Yes   Status: Chronic   


Plan to address problem: 


see orders.








(2) Hemolytic crisis


Current Visit: No   Status: Acute   


Plan to address problem: 


supportive., and follow labs.








(3) Anemia


Current Visit: Yes   Status: Acute   


Plan to address problem: 


monitor, adjust labs.








(4) Dehydration


Current Visit: Yes   Status: Acute   


Plan to address problem: 


hydration.








(5) Hypoxemia


Current Visit: Yes   Status: Chronic   


Plan to address problem: 


oxygen








Subjective


Date of service: 10/13/19


Principal diagnosis: SCD/Pain crisis, Anemia with hemolysis.


Interval history: 


Patient seen/examined, resting in bed, labs reviewed, case d/w patient. No new 

issues at this time. will continue with current management.


Patient seen/examined, resting in bed, labs reviewed,  case d/w patient. Will 

continue with current management.


Patient seen/examined, resting in bed, labs reviewed, case d/w patient.Will 

continue to manage pain, and start d/c plans .


Patient seen/examined, resting in bed, labs reviewed, and will need transfusion 

replacement.





Objective





- Constitutional


Vitals: 


                               Vital Signs - 12hr











  10/13/19 10/13/19 10/13/19





  00:34 03:11 05:22


 


Temperature   97.3 F L


 


Pulse Rate   72


 


Respiratory 22 22 20





Rate   


 


Blood Pressure   97/54


 


O2 Sat by Pulse   96





Oximetry   














  10/13/19 10/13/19 10/13/19





  05:26 06:28 08:26


 


Temperature 97.3 F L  


 


Pulse Rate   


 


Respiratory 20 20 





Rate   


 


Blood Pressure   


 


O2 Sat by Pulse   97





Oximetry   











General appearance: Present: mild distress, well-nourished





- EENT


Eyes: PERRL, EOM intact


ENT: hearing intact, clear oral mucosa


Ears: bilateral: normal





- Neck


Neck: supple, normal ROM





- Respiratory


Respiratory effort: normal


Respiratory: bilateral: CTA





- Breasts


Breasts: deferred





- Cardiovascular


Rhythm: regular


Heart Sounds: Present: S1 & S2.  Absent: gallop, rub


Extremities: pulses intact, No edema, normal color, Full ROM





- Gastrointestinal


General gastrointestinal: Present: soft, non-tender, non-distended, normal bowel

sounds


Rectal Exam: deferred





- Genitourinary


Male genitourinary: deferred





- Integumentary


Integumentary: clear, warm, dry





- Musculoskeletal


Musculoskeletal: 1, strength equal bilaterally





- Neurologic


Neurologic: moves all extremities





- Psychiatric


Psychiatric: memory intact, appropriate mood/affect, intact judgment & insight





- Labs


CBC & Chem 7: 


                                 10/13/19 06:15





                                 10/13/19 06:15


Labs: 


                              Abnormal lab results











  10/13/19 10/13/19 Range/Units





  06:15 06:15 


 


RBC  2.05 L   (3.65-5.03)  M/mm3


 


Hgb  6.9 L   (11.8-15.2)  gm/dl


 


Hct  18.8 L*   (35.5-45.6)  %


 


MCH  34 H   (28-32)  pg


 


MCHC  37 H   (32-34)  %


 


RDW  25.6 H   (13.2-15.2)  %


 


Percent Retic  6.45 H   (0.78-2.58)  %


 


Potassium   5.2 H  (3.6-5.0)  mmol/L


 


Creatinine   0.4 L  (0.8-1.5)  mg/dL

## 2019-10-14 LAB
HCT VFR BLD CALC: 22.8 % (ref 35.5–45.6)
HGB BLD-MCNC: 8.5 GM/DL (ref 11.8–15.2)

## 2019-10-14 RX ADMIN — DIPHENHYDRAMINE HYDROCHLORIDE PRN MG: 50 INJECTION, SOLUTION INTRAMUSCULAR; INTRAVENOUS at 06:27

## 2019-10-14 RX ADMIN — DIPHENHYDRAMINE HYDROCHLORIDE PRN MG: 50 INJECTION, SOLUTION INTRAMUSCULAR; INTRAVENOUS at 00:24

## 2019-10-14 RX ADMIN — HYDROMORPHONE HYDROCHLORIDE SCH MG: 2 INJECTION, SOLUTION INTRAMUSCULAR; INTRAVENOUS; SUBCUTANEOUS at 03:47

## 2019-10-14 RX ADMIN — APIXABAN SCH MG: 2.5 TABLET, FILM COATED ORAL at 10:31

## 2019-10-14 RX ADMIN — HYDROMORPHONE HYDROCHLORIDE SCH MG: 2 INJECTION, SOLUTION INTRAMUSCULAR; INTRAVENOUS; SUBCUTANEOUS at 19:18

## 2019-10-14 RX ADMIN — FOLIC ACID SCH MG: 1 TABLET ORAL at 10:31

## 2019-10-14 RX ADMIN — DEXTROSE AND SODIUM CHLORIDE SCH MLS/HR: 5; .2 INJECTION, SOLUTION INTRAVENOUS at 05:19

## 2019-10-14 RX ADMIN — HYDROMORPHONE HYDROCHLORIDE SCH MG: 2 INJECTION, SOLUTION INTRAMUSCULAR; INTRAVENOUS; SUBCUTANEOUS at 00:22

## 2019-10-14 RX ADMIN — HYDROMORPHONE HYDROCHLORIDE SCH MG: 2 INJECTION, SOLUTION INTRAMUSCULAR; INTRAVENOUS; SUBCUTANEOUS at 06:27

## 2019-10-14 RX ADMIN — HYDROMORPHONE HYDROCHLORIDE SCH MG: 2 INJECTION, SOLUTION INTRAMUSCULAR; INTRAVENOUS; SUBCUTANEOUS at 13:24

## 2019-10-14 RX ADMIN — DIPHENHYDRAMINE HYDROCHLORIDE PRN MG: 50 INJECTION, SOLUTION INTRAMUSCULAR; INTRAVENOUS at 13:22

## 2019-10-14 RX ADMIN — HYDROMORPHONE HYDROCHLORIDE SCH MG: 2 INJECTION, SOLUTION INTRAMUSCULAR; INTRAVENOUS; SUBCUTANEOUS at 22:01

## 2019-10-14 RX ADMIN — DEXTROSE AND SODIUM CHLORIDE SCH MLS/HR: 5; .2 INJECTION, SOLUTION INTRAVENOUS at 14:21

## 2019-10-14 RX ADMIN — HYDROMORPHONE HYDROCHLORIDE SCH MG: 2 INJECTION, SOLUTION INTRAMUSCULAR; INTRAVENOUS; SUBCUTANEOUS at 10:29

## 2019-10-14 RX ADMIN — APIXABAN SCH MG: 2.5 TABLET, FILM COATED ORAL at 22:04

## 2019-10-14 RX ADMIN — DIPHENHYDRAMINE HYDROCHLORIDE PRN MG: 50 INJECTION, SOLUTION INTRAMUSCULAR; INTRAVENOUS at 19:18

## 2019-10-14 RX ADMIN — HYDROMORPHONE HYDROCHLORIDE SCH MG: 2 INJECTION, SOLUTION INTRAMUSCULAR; INTRAVENOUS; SUBCUTANEOUS at 17:11

## 2019-10-14 RX ADMIN — HYDROXYUREA SCH MG: 500 CAPSULE ORAL at 10:32

## 2019-10-14 NOTE — DISCHARGE SUMMARY
Providers





- Providers


Date of Admission: 


10/10/19 10:13





Date of discharge: 10/14/19


Attending physician: 


DARLENE KIM





Primary care physician: 


DARLENE KIM








Hospitalization


Reason for admission: SCD/Pain crisis.


Condition: Fair


Hospital course: 





Patient presented to the Er with pain all over, dx with acute SCd with pain 

crisis , and admitted for sxs management, and control. He was treated with 

hydration, pain control, and blood transfusion. H/H, to be repeated, post 

transfusion, and D/C home. He denies any new issues at this time.


Disposition: DC-01 TO HOME OR SELFCARE





- Discharge Diagnoses


(1) Sickle cell anemia with pain


Status: Resolved   





(2) Hemolytic crisis


Status: Chronic   





(3) Anemia


Status: Chronic   





(4) Dehydration


Status: Resolved   





(5) Hypoxemia


Status: Chronic   





Core Measure Documentation





- Palliative Care


Palliative Care/ Comfort Measures: Not Applicable





- Core Measures


Any of the following diagnoses?: history only





Exam





- Constitutional


Vitals: 


                                        











Temp Pulse Resp BP Pulse Ox


 


 98.1 F   83   18   101/59   94 


 


 10/14/19 04:29  10/14/19 04:29  10/14/19 06:57  10/14/19 04:29  10/14/19 04:29











General appearance: Present: no acute distress, well-nourished





- EENT


Eyes: Present: PERRL


ENT: hearing intact, clear oral mucosa





- Neck


Neck: Present: supple, normal ROM





- Respiratory


Respiratory effort: normal


Respiratory: bilateral: CTA





- Cardiovascular


Heart Sounds: Present: S1 & S2.  Absent: rub, click





- Extremities


Extremities: pulses symmetrical, No edema


Peripheral Pulses: within normal limits





- Abdominal


General gastrointestinal: Present: soft, non-tender, non-distended, normal bowel

sounds


Male genitourinary: Present: deferred





- Rectal


Rectal Exam: deferred





- Integumentary


Integumentary: Present: clear, warm, dry





- Musculoskeletal


Musculoskeletal: gait normal, strength equal bilaterally





- Psychiatric


Psychiatric: appropriate mood/affect, intact judgment & insight





- Neurologic


Neurologic: CNII-XII intact, moves all extremities





Plan


Activity: no restrictions


Diet: regular


Follow up with: 


DARLENE KIM DO [Primary Care Provider] - 3-5 Days

## 2019-10-14 NOTE — PROGRESS NOTE
Assessment and Plan





- Patient Problems


(1) Sickle cell anemia with pain


Current Visit: Yes   Status: Chronic   


Plan to address problem: 


see orders.








(2) Hemolytic crisis


Current Visit: No   Status: Acute   


Plan to address problem: 


supportive., and follow labs.








(3) Anemia


Current Visit: Yes   Status: Acute   


Plan to address problem: 


monitor, adjust labs.








(4) Dehydration


Current Visit: Yes   Status: Acute   


Plan to address problem: 


hydration.








(5) Hypoxemia


Current Visit: Yes   Status: Chronic   


Plan to address problem: 


oxygen








Subjective


Date of service: 10/14/19


Principal diagnosis: SCD/Pain crisis, Anemia with hemolysis.


Interval history: 


Patient seen/examined, resting in bed, labs reviewed, case d/w patient. No new 

issues at this time. will continue with current management.


Patient seen/examined, resting in bed, labs reviewed,  case d/w patient. Will 

continue with current management.


Patient seen/examined, resting in bed, labs reviewed, case d/w patient.Will 

continue to manage pain, and start d/c plans .


Patient seen/examined, resting in bed, labs reviewed, and will need transfusion 

replacement.


Patient seen/examined, resting in bed, awaiting blood transfusion.Once done, 

will plan for D/C.





Objective





- Constitutional


Vitals: 


                               Vital Signs - 12hr











  10/13/19 10/14/19 10/14/19





  22:30 00:22 00:52


 


Temperature 98.5 F  


 


Pulse Rate 84  


 


Respiratory 24 18 18





Rate   


 


Blood Pressure 117/70  


 


O2 Sat by Pulse 95  





Oximetry   














  10/14/19 10/14/19 10/14/19





  01:37 01:52 02:22


 


Temperature 98.5 F 98.3 F 98.3 F


 


Pulse Rate 84 84 76


 


Respiratory 18 18 18





Rate   


 


Blood Pressure 118/70 113/70 111/68


 


O2 Sat by Pulse 96 95 995 H





Oximetry   














  10/14/19 10/14/19 10/14/19





  02:52 03:22 03:47


 


Temperature 98.3 F 98.3 F 


 


Pulse Rate 78 84 


 


Respiratory 18 18 18





Rate   


 


Blood Pressure 113/76 125/76 


 


O2 Sat by Pulse 95 97 





Oximetry   














  10/14/19 10/14/19 10/14/19





  04:17 04:29 06:27


 


Temperature  98.1 F 


 


Pulse Rate  83 


 


Respiratory 20 24 18





Rate   


 


Blood Pressure  101/59 


 


O2 Sat by Pulse  94 





Oximetry   














  10/14/19





  06:57


 


Temperature 


 


Pulse Rate 


 


Respiratory 18





Rate 


 


Blood Pressure 


 


O2 Sat by Pulse 





Oximetry 











General appearance: Present: mild distress, well-nourished





- EENT


Eyes: PERRL, EOM intact


ENT: hearing intact, clear oral mucosa


Ears: bilateral: normal





- Neck


Neck: supple, normal ROM





- Respiratory


Respiratory effort: normal


Respiratory: bilateral: CTA





- Breasts


Breasts: deferred





- Cardiovascular


Rhythm: regular


Heart Sounds: Present: S1 & S2.  Absent: gallop, rub


Extremities: pulses intact, No edema, normal color, Full ROM





- Gastrointestinal


General gastrointestinal: Present: soft, non-tender, non-distended, normal bowel

sounds


Rectal Exam: deferred





- Genitourinary


Male genitourinary: deferred





- Integumentary


Integumentary: clear, warm, dry





- Musculoskeletal


Musculoskeletal: 1, strength equal bilaterally





- Neurologic


Neurologic: moves all extremities





- Psychiatric


Psychiatric: memory intact, appropriate mood/affect, intact judgment & insight





- Labs


CBC & Chem 7: 


                                 10/13/19 06:15





                                 10/13/19 06:15


Labs: 


                              Abnormal lab results











  10/13/19 10/13/19 Range/Units





  06:15 11:05 


 


Monocytes % (Manual)  9.0 H   (0.0-7.3)  %


 


Eosinophils % (Manual)  7.0 H   (0.0-4.3)  %


 


Nucleated RBC %  8.0 H   (0.0-0.9)  %


 


Monocytes # (Manual)  0.9 H   (0.0-0.8)  K/mm3


 


Eosinophils # (Manual)  0.7 H   (0.0-0.4)  K/mm3


 


Crossmatch   See Detail

## 2019-10-15 RX ADMIN — DEXTROSE AND SODIUM CHLORIDE SCH MLS/HR: 5; .2 INJECTION, SOLUTION INTRAVENOUS at 18:15

## 2019-10-15 RX ADMIN — HYDROMORPHONE HYDROCHLORIDE SCH MG: 2 INJECTION, SOLUTION INTRAMUSCULAR; INTRAVENOUS; SUBCUTANEOUS at 09:00

## 2019-10-15 RX ADMIN — DIPHENHYDRAMINE HYDROCHLORIDE PRN MG: 50 INJECTION, SOLUTION INTRAMUSCULAR; INTRAVENOUS at 01:27

## 2019-10-15 RX ADMIN — FOLIC ACID SCH MG: 1 TABLET ORAL at 09:23

## 2019-10-15 RX ADMIN — HYDROMORPHONE HYDROCHLORIDE SCH MG: 2 INJECTION, SOLUTION INTRAMUSCULAR; INTRAVENOUS; SUBCUTANEOUS at 15:34

## 2019-10-15 RX ADMIN — DIPHENHYDRAMINE HYDROCHLORIDE PRN MG: 50 INJECTION, SOLUTION INTRAMUSCULAR; INTRAVENOUS at 06:16

## 2019-10-15 RX ADMIN — DEXTROSE AND SODIUM CHLORIDE SCH MLS/HR: 5; .2 INJECTION, SOLUTION INTRAVENOUS at 00:05

## 2019-10-15 RX ADMIN — HYDROMORPHONE HYDROCHLORIDE SCH MG: 2 INJECTION, SOLUTION INTRAMUSCULAR; INTRAVENOUS; SUBCUTANEOUS at 12:13

## 2019-10-15 RX ADMIN — HYDROXYUREA SCH MG: 500 CAPSULE ORAL at 09:24

## 2019-10-15 RX ADMIN — HYDROMORPHONE HYDROCHLORIDE SCH MG: 2 INJECTION, SOLUTION INTRAMUSCULAR; INTRAVENOUS; SUBCUTANEOUS at 21:20

## 2019-10-15 RX ADMIN — DIPHENHYDRAMINE HYDROCHLORIDE PRN MG: 50 INJECTION, SOLUTION INTRAMUSCULAR; INTRAVENOUS at 12:13

## 2019-10-15 RX ADMIN — HYDROMORPHONE HYDROCHLORIDE SCH MG: 2 INJECTION, SOLUTION INTRAMUSCULAR; INTRAVENOUS; SUBCUTANEOUS at 18:15

## 2019-10-15 RX ADMIN — DIPHENHYDRAMINE HYDROCHLORIDE PRN MG: 50 INJECTION, SOLUTION INTRAMUSCULAR; INTRAVENOUS at 18:14

## 2019-10-15 RX ADMIN — APIXABAN SCH MG: 2.5 TABLET, FILM COATED ORAL at 09:23

## 2019-10-15 RX ADMIN — HYDROMORPHONE HYDROCHLORIDE SCH MG: 2 INJECTION, SOLUTION INTRAMUSCULAR; INTRAVENOUS; SUBCUTANEOUS at 06:16

## 2019-10-15 RX ADMIN — HYDROMORPHONE HYDROCHLORIDE SCH MG: 2 INJECTION, SOLUTION INTRAMUSCULAR; INTRAVENOUS; SUBCUTANEOUS at 01:27

## 2019-10-15 RX ADMIN — DEXTROSE AND SODIUM CHLORIDE SCH MLS/HR: 5; .2 INJECTION, SOLUTION INTRAVENOUS at 12:15

## 2019-10-15 RX ADMIN — APIXABAN SCH MG: 2.5 TABLET, FILM COATED ORAL at 21:20

## 2019-10-15 RX ADMIN — DEXTROSE AND SODIUM CHLORIDE SCH MLS/HR: 5; .2 INJECTION, SOLUTION INTRAVENOUS at 06:21

## 2019-10-15 RX ADMIN — HYDROMORPHONE HYDROCHLORIDE SCH: 2 INJECTION, SOLUTION INTRAMUSCULAR; INTRAVENOUS; SUBCUTANEOUS at 06:24

## 2019-10-15 NOTE — PROGRESS NOTE
Assessment and Plan





- Patient Problems


(1) Sickle cell anemia with pain


Current Visit: Yes   Status: Resolved   


Plan to address problem: 


see orders.


no new issues at this time.








(2) Hemolytic crisis


Current Visit: No   Status: Chronic   


Plan to address problem: 


supportive., and follow labs.


stable.








(3) Anemia


Current Visit: Yes   Status: Chronic   


Plan to address problem: 


monitor, adjust labs.


stable.








(4) Dehydration


Current Visit: Yes   Status: Resolved   


Plan to address problem: 


hydration.


resolved.








(5) Hypoxemia


Current Visit: Yes   Status: Chronic   


Plan to address problem: 


oxygen


chronic, stable.








Subjective


Date of service: 10/15/19


Principal diagnosis: SCD/Pain crisis, Anemia with hemolysis.


Interval history: 


Patient seen/examined, resting in bed, labs reviewed, case d/w patient. No new 

issues at this time. will continue with current management.


Patient seen/examined, resting in bed, labs reviewed,  case d/w patient. Will 

continue with current management.


Patient seen/examined, resting in bed, labs reviewed, case d/w patient.Will 

continue to manage pain, and start d/c plans .


Patient seen/examined, resting in bed, labs reviewed, and will need transfusion 

replacement.


Patient seen/examined, resting in bed, awaiting blood transfusion.Once done, 

will plan for D/C.


Patient seen/examined, resting in bed, records reviewed, case d/w him. c/o pain 

as of yesterday. he will need to be d/c tomorrow ,according to his appeal 

process.





Objective





- Constitutional


Vitals: 


                               Vital Signs - 12hr











  10/15/19 10/15/19 10/15/19





  09:45 10:00 11:47


 


Temperature   98.1 F


 


Pulse Rate   79


 


Respiratory   18





Rate   


 


Blood Pressure   107/61


 


O2 Sat by Pulse 96 96 96





Oximetry   














  10/15/19





  16:31


 


Temperature 98.5 F


 


Pulse Rate 86


 


Respiratory 18





Rate 


 


Blood Pressure 111/71


 


O2 Sat by Pulse 94





Oximetry 











General appearance: Present: no acute distress, well-nourished





- EENT


Eyes: PERRL, EOM intact


ENT: hearing intact, clear oral mucosa


Ears: bilateral: normal





- Neck


Neck: supple, normal ROM





- Respiratory


Respiratory effort: normal


Respiratory: bilateral: CTA





- Breasts


Breasts: deferred





- Cardiovascular


Rhythm: regular


Heart Sounds: Present: S1 & S2.  Absent: gallop, rub


Extremities: pulses intact, No edema, normal color, Full ROM





- Gastrointestinal


General gastrointestinal: Present: soft, non-tender, non-distended, normal bowel

sounds


Rectal Exam: deferred





- Genitourinary


Male genitourinary: deferred





- Integumentary


Integumentary: clear, warm, dry





- Musculoskeletal


Musculoskeletal: 1, strength equal bilaterally





- Neurologic


Neurologic: moves all extremities





- Psychiatric


Psychiatric: memory intact, appropriate mood/affect, intact judgment & insight





- Labs


CBC & Chem 7: 


                                 10/14/19 10:44





                                 10/13/19 06:15

## 2019-10-16 VITALS — SYSTOLIC BLOOD PRESSURE: 111 MMHG | DIASTOLIC BLOOD PRESSURE: 69 MMHG

## 2019-10-16 RX ADMIN — HYDROMORPHONE HYDROCHLORIDE SCH MG: 2 INJECTION, SOLUTION INTRAMUSCULAR; INTRAVENOUS; SUBCUTANEOUS at 09:40

## 2019-10-16 RX ADMIN — FOLIC ACID SCH MG: 1 TABLET ORAL at 12:38

## 2019-10-16 RX ADMIN — DIPHENHYDRAMINE HYDROCHLORIDE PRN MG: 50 INJECTION, SOLUTION INTRAMUSCULAR; INTRAVENOUS at 11:46

## 2019-10-16 RX ADMIN — HYDROMORPHONE HYDROCHLORIDE SCH MG: 2 INJECTION, SOLUTION INTRAMUSCULAR; INTRAVENOUS; SUBCUTANEOUS at 03:03

## 2019-10-16 RX ADMIN — DEXTROSE AND SODIUM CHLORIDE SCH MLS/HR: 5; .2 INJECTION, SOLUTION INTRAVENOUS at 00:47

## 2019-10-16 RX ADMIN — HYDROMORPHONE HYDROCHLORIDE SCH MG: 2 INJECTION, SOLUTION INTRAMUSCULAR; INTRAVENOUS; SUBCUTANEOUS at 00:41

## 2019-10-16 RX ADMIN — DIPHENHYDRAMINE HYDROCHLORIDE PRN MG: 50 INJECTION, SOLUTION INTRAMUSCULAR; INTRAVENOUS at 06:24

## 2019-10-16 RX ADMIN — DIPHENHYDRAMINE HYDROCHLORIDE PRN MG: 50 INJECTION, SOLUTION INTRAMUSCULAR; INTRAVENOUS at 00:40

## 2019-10-16 RX ADMIN — HYDROXYUREA SCH MG: 500 CAPSULE ORAL at 09:41

## 2019-10-16 RX ADMIN — HYDROMORPHONE HYDROCHLORIDE SCH MG: 2 INJECTION, SOLUTION INTRAMUSCULAR; INTRAVENOUS; SUBCUTANEOUS at 06:25

## 2019-10-16 RX ADMIN — APIXABAN SCH MG: 2.5 TABLET, FILM COATED ORAL at 09:41

## 2019-11-10 ENCOUNTER — HOSPITAL ENCOUNTER (INPATIENT)
Dept: HOSPITAL 5 - ED | Age: 29
LOS: 4 days | Discharge: HOME | DRG: 812 | End: 2019-11-14
Attending: INTERNAL MEDICINE | Admitting: INTERNAL MEDICINE
Payer: MEDICARE

## 2019-11-10 DIAGNOSIS — Z79.01: ICD-10-CM

## 2019-11-10 DIAGNOSIS — E86.0: ICD-10-CM

## 2019-11-10 DIAGNOSIS — D64.9: ICD-10-CM

## 2019-11-10 DIAGNOSIS — Z79.899: ICD-10-CM

## 2019-11-10 DIAGNOSIS — D57.00: Primary | ICD-10-CM

## 2019-11-10 DIAGNOSIS — Z86.718: ICD-10-CM

## 2019-11-10 DIAGNOSIS — I82.210: ICD-10-CM

## 2019-11-10 LAB
BAND NEUTROPHILS # (MANUAL): 0 K/MM3
HCT VFR BLD CALC: 19.1 % (ref 35.5–45.6)
HGB BLD-MCNC: 7.1 GM/DL (ref 11.8–15.2)
HGB S BLD QL SOLY: (no result)
MCHC RBC AUTO-ENTMCNC: 37 % (ref 32–34)
MCV RBC AUTO: 92 FL (ref 84–94)
MYELOCYTES # (MANUAL): 0 K/MM3
PLATELET # BLD: 191 K/MM3 (ref 140–440)
PROMYELOCYTES # (MANUAL): 0 K/MM3
RBC # BLD AUTO: 2.07 M/MM3 (ref 3.65–5.03)
TARGETS BLD QL SMEAR: (no result)
TOTAL CELLS COUNTED BLD: 100

## 2019-11-10 PROCEDURE — 80048 BASIC METABOLIC PNL TOTAL CA: CPT

## 2019-11-10 PROCEDURE — 36415 COLL VENOUS BLD VENIPUNCTURE: CPT

## 2019-11-10 PROCEDURE — 94640 AIRWAY INHALATION TREATMENT: CPT

## 2019-11-10 PROCEDURE — 85018 HEMOGLOBIN: CPT

## 2019-11-10 PROCEDURE — 71045 X-RAY EXAM CHEST 1 VIEW: CPT

## 2019-11-10 PROCEDURE — 94760 N-INVAS EAR/PLS OXIMETRY 1: CPT

## 2019-11-10 PROCEDURE — 87116 MYCOBACTERIA CULTURE: CPT

## 2019-11-10 PROCEDURE — 85045 AUTOMATED RETICULOCYTE COUNT: CPT

## 2019-11-10 PROCEDURE — 96375 TX/PRO/DX INJ NEW DRUG ADDON: CPT

## 2019-11-10 PROCEDURE — 86901 BLOOD TYPING SEROLOGIC RH(D): CPT

## 2019-11-10 PROCEDURE — 86920 COMPATIBILITY TEST SPIN: CPT

## 2019-11-10 PROCEDURE — 85025 COMPLETE CBC W/AUTO DIFF WBC: CPT

## 2019-11-10 PROCEDURE — A6250 SKIN SEAL PROTECT MOISTURIZR: HCPCS

## 2019-11-10 PROCEDURE — 85007 BL SMEAR W/DIFF WBC COUNT: CPT

## 2019-11-10 PROCEDURE — 86900 BLOOD TYPING SEROLOGIC ABO: CPT

## 2019-11-10 PROCEDURE — 86850 RBC ANTIBODY SCREEN: CPT

## 2019-11-10 PROCEDURE — 85660 RBC SICKLE CELL TEST: CPT

## 2019-11-10 PROCEDURE — 85014 HEMATOCRIT: CPT

## 2019-11-10 PROCEDURE — P9016 RBC LEUKOCYTES REDUCED: HCPCS

## 2019-11-10 PROCEDURE — 96365 THER/PROPH/DIAG IV INF INIT: CPT

## 2019-11-10 RX ADMIN — DEXTROSE AND SODIUM CHLORIDE SCH MLS/HR: 5; .2 INJECTION, SOLUTION INTRAVENOUS at 16:18

## 2019-11-10 RX ADMIN — DEXTROSE AND SODIUM CHLORIDE SCH MLS/HR: 5; .2 INJECTION, SOLUTION INTRAVENOUS at 20:15

## 2019-11-10 RX ADMIN — HYDROMORPHONE HYDROCHLORIDE PRN MG: 2 INJECTION, SOLUTION INTRAMUSCULAR; INTRAVENOUS; SUBCUTANEOUS at 23:00

## 2019-11-10 RX ADMIN — HYDROMORPHONE HYDROCHLORIDE PRN MG: 2 INJECTION, SOLUTION INTRAMUSCULAR; INTRAVENOUS; SUBCUTANEOUS at 12:47

## 2019-11-10 RX ADMIN — ALBUTEROL SULFATE SCH MG: 2.5 SOLUTION RESPIRATORY (INHALATION) at 20:10

## 2019-11-10 RX ADMIN — APIXABAN SCH MG: 2.5 TABLET, FILM COATED ORAL at 21:15

## 2019-11-10 RX ADMIN — MORPHINE SULFATE SCH MG: 30 TABLET, FILM COATED, EXTENDED RELEASE ORAL at 21:14

## 2019-11-10 RX ADMIN — HYDROMORPHONE HYDROCHLORIDE PRN MG: 2 INJECTION, SOLUTION INTRAMUSCULAR; INTRAVENOUS; SUBCUTANEOUS at 20:08

## 2019-11-10 RX ADMIN — DEXTROSE AND SODIUM CHLORIDE SCH MLS/HR: 5; .2 INJECTION, SOLUTION INTRAVENOUS at 12:43

## 2019-11-10 RX ADMIN — HYDROMORPHONE HYDROCHLORIDE PRN MG: 2 INJECTION, SOLUTION INTRAMUSCULAR; INTRAVENOUS; SUBCUTANEOUS at 16:12

## 2019-11-10 NOTE — EMERGENCY DEPARTMENT REPORT
ED General Adult HPI





- General


Chief complaint: Sickle Cell Crisis


Stated complaint: SICKLE CELL CRISIS


Time Seen by Provider: 11/10/19 06:35


Source: EMS


Mode of arrival: Stretcher


Limitations: No Limitations





- History of Present Illness


Initial comments: 





Brooks is a 30 yo male with hx of hgb SS disease who presents with severe chest 

and upper lower back pain for 2 days.  Feels like he has a chest cold.  No 

fever.  No shortness of breath. 10/10 pain. Gradual onset.  No radiation of the 

pain. Home meds did not provide any relief.  Achy in nature.  Constant pain. 

Home medications include: folic acid, hydroxyurea, Morphine 30 mg BID ER, 

Percocet 10/325. Hx of hypoxia on home oxygen.


-: Gradual, days(s) (2)


Location: chest, back


Severity scale (0 -10): 10


Quality: aching


Consistency: constant


Improves with: none


Worsens with: none


Associated Symptoms: cough





- Related Data


                                Home Medications











 Medication  Instructions  Recorded  Confirmed  Last Taken


 


Folic Acid [Folvite] 1 mg PO DAILY 06/21/18 10/09/19 09/10/19


 


Morphine ER [Ms Contin ER] 30 mg PO BID 06/21/18 10/09/19 09/10/19


 


Zolpidem [Ambien] 10 mg PO QHS PRN 08/12/19 10/09/19 09/10/19








                                  Previous Rx's











 Medication  Instructions  Recorded  Last Taken  Type


 


Hydroxyurea [Droxia] 1,500 mg PO DAILY #30 capsule 02/24/18 09/10/19 Rx


 


Oxycodone HCl/Acetaminophen 1 tab PO Q4-6H PRN #6 tablet 05/05/19 09/10/19 Rx





[Percocet 10/325 mg]    


 


Apixaban [Eliquis] 2.5 mg PO BID #60 tablet 06/10/19 09/10/19 Rx











                                    Allergies











Allergy/AdvReac Type Severity Reaction Status Date / Time


 


No Known Allergies Allergy   Verified 08/12/19 14:45














ED Review of Systems


ROS: 


Stated complaint: SICKLE CELL CRISIS


Other details as noted in HPI





Comment: All other systems reviewed and negative


Constitutional: malaise.  denies: fever


Respiratory: cough


Cardiovascular: chest pain


Musculoskeletal: back pain





ED Past Medical Hx





- Past Medical History


Previous Medical History?: Yes


Hx Hypertension: No


Hx CVA: No


Hx Heart Attack/AMI: No


Hx Congestive Heart Failure: No


Hx Diabetes: No


Hx Deep Vein Thrombosis: No


Hx Pulmonary Embolism: No


Hx GERD: No


Hx Liver Disease: No


Hx Renal Disease: No


Hx Sickle Cell Disease: Yes


Hx Arthritis: No


Hx Headaches / Migraines: No


Hx Seizures: No


Hx Kidney Stones: No


Hx Psychiatric Treatment: No


Hx Asthma: Yes (denies hx exacerbations)


Hx COPD: No


Hx Tuberculosis: No


Hx Dementia: No


Hx HIV: No


Additional medical history: Acute chest syndrome,





- Surgical History


Hx Coronary Stent: No


Hx Open Heart Surgery: No


Hx Pacemaker: No


Hx Internal Defibrillator: No


Hx Cholecystectomy: Yes


Hx Appendectomy: No


Hx Breast Surgery: No


Additional Surgical History: TRACHEOSTOMY.  2 PORTS AND REMOVAL.  LIVER BIOPSY 

X2





- Social History


Smoking Status: Never Smoker





- Medications


Home Medications: 


                                Home Medications











 Medication  Instructions  Recorded  Confirmed  Last Taken  Type


 


Hydroxyurea [Droxia] 1,500 mg PO DAILY #30 capsule 02/24/18 10/09/19 09/10/19 Rx


 


Folic Acid [Folvite] 1 mg PO DAILY 06/21/18 10/09/19 09/10/19 History


 


Morphine ER [Ms Contin ER] 30 mg PO BID 06/21/18 10/09/19 09/10/19 History


 


Oxycodone HCl/Acetaminophen 1 tab PO Q4-6H PRN #6 tablet 05/05/19 10/09/19 

09/10/19 Rx





[Percocet 10/325 mg]     


 


Apixaban [Eliquis] 2.5 mg PO BID #60 tablet 06/10/19 10/09/19 09/10/19 Rx


 


Zolpidem [Ambien] 10 mg PO QHS PRN 08/12/19 10/09/19 09/10/19 History














ED Physical Exam





- General


Limitations: No Limitations


General appearance: alert, in no apparent distress, other (nontoxic but appears 

ill)





- Head


Head exam: Present: atraumatic, normocephalic





- Eye


Eye exam: Present: PERRL, EOMI, scleral icterus.  Absent: conjunctival injection





- ENT


ENT exam: Present: mucous membranes moist





- Neck


Neck exam: Present: normal inspection





- Respiratory


Respiratory exam: Present: normal lung sounds bilaterally.  Absent: respiratory 

distress, wheezes, rales, rhonchi





- Cardiovascular


Cardiovascular Exam: Present: regular rate, normal rhythm, normal heart sounds. 

Absent: systolic murmur, diastolic murmur, rubs, gallop





- GI/Abdominal


GI/Abdominal exam: Present: soft, normal bowel sounds.  Absent: distended, 

tenderness, guarding, rebound





- Rectal


Rectal exam: Present: deferred





- Extremities Exam


Extremities exam: Present: normal inspection





- Back Exam


Back exam: Present: normal inspection





- Neurological Exam


Neurological exam: Present: alert, oriented X3





- Psychiatric


Psychiatric exam: Present: normal affect, normal mood





- Skin


Skin exam: Present: warm, dry, intact, normal color.  Absent: rash





ED Course





                                   Vital Signs











  11/10/19 11/10/19 11/10/19





  04:23 06:07 07:15


 


Temperature 98.5 F  


 


Pulse Rate 85 80 74


 


Respiratory 17 17 18





Rate   


 


Blood Pressure 119/58  


 


Blood Pressure  104/62 124/63





[Right]   


 


O2 Sat by Pulse 100 100 100





Oximetry   














  11/10/19





  08:31


 


Temperature 98.0 F


 


Pulse Rate 62


 


Respiratory 22





Rate 


 


Blood Pressure 


 


Blood Pressure 111/67





[Right] 


 


O2 Sat by Pulse 98





Oximetry 














ED Medical Decision Making





- Lab Data


Result diagrams: 


                                11/10/19 Unknown








- Radiology Data


Radiology results: report reviewed





Radiology impression of chest radiographs portable one view: Mild cardiomegaly m

ild chronic interstitial lung disease





- Medical Decision Making





Mr. Zhao presents with sickle cell vaso-occlusive crisis.  Hemoglobin and 

hematocrit decreased from previous values.  On today's presentation, he appears 

ill.  He is in severe uncontrollable pain.  He is afebrile. Dr. Castillo 

accepted Mr. Zhao to his service.  I provided bridging orders for adm

ission including pain management and IV fluid therapy.  


Critical care attestation.: 


If time is entered above; I have spent that time in minutes in the direct care 

of this critically ill patient, excluding procedure time.








ED Disposition


Clinical Impression: 


 Sickle cell crisis, Sickle cell anemia, Symptomatic anemia





Disposition: DC-09 OP ADMIT IP TO THIS HOSP


Is pt being admited?: Yes


Does the pt Need Aspirin: No

## 2019-11-10 NOTE — HISTORY AND PHYSICAL REPORT
History of Present Illness


Date of examination: 11/10/19


Date of admission: 


11/10/19 06:44





Chief complaint: 





SCD/Pain crisis/anemia.


History of present illness: 


Patient seen/examined, resting in bed, records reviewed, case d/w patient. He 

had presented to the ER with CC of diffuse joint pain, and unable to control his

pain , at home.He was admitted ,for pain /sxs management, and control.Patient is

getting hydration, pain control, oxygen, and lab monitoring.He has hx of chronic

hypoxia, now acutely exacerbated., and he will get some NEB TX.He has hx of SVC 

DVT, and on anti coagulation, which will be continued .He will get blood 

transfusion, if hgb ,less than 7.0, or sxs.Once stable, he will be D/[place] home.








Past History


Past Medical History: anemia, DVT


Social history: no significant social history, single, lives with family


Family history: no significant family history





Medications and Allergies


                                    Allergies











Allergy/AdvReac Type Severity Reaction Status Date / Time


 


No Known Allergies Allergy   Verified 08/12/19 14:45











                                Home Medications











 Medication  Instructions  Recorded  Confirmed  Last Taken  Type


 


Hydroxyurea [Droxia] 1,500 mg PO DAILY #30 capsule 02/24/18 10/09/19 09/10/19 Rx


 


Folic Acid [Folvite] 1 mg PO DAILY 06/21/18 10/09/19 09/10/19 History


 


Morphine ER [Ms Contin ER] 30 mg PO BID 06/21/18 10/09/19 09/10/19 History


 


Oxycodone HCl/Acetaminophen 1 tab PO Q4-6H PRN #6 tablet 05/05/19 10/09/19 

09/10/19 Rx





[Percocet 10/325 mg]     


 


Apixaban [Eliquis] 2.5 mg PO BID #60 tablet 06/10/19 10/09/19 09/10/19 Rx


 


Zolpidem [Ambien] 10 mg PO QHS PRN 08/12/19 10/09/19 09/10/19 History











Active Meds: 


Active Medications





Albuterol (Proventil)  2.5 mg IH Q6HRT DULCE


Apixaban (Eliquis)  2.5 mg PO BID DULCE; Protocol


Diphenhydramine HCl (Benadryl)  12.5 mg IV Q3H PRN


   PRN Reason: Itching


   Last Admin: 11/10/19 16:12 Dose:  12.5 mg


   Documented by: 


Folic Acid (Folvite)  1 mg PO DAILY DULCE


Hydromorphone HCl (Dilaudid)  3 mg IV Q3H PRN


   PRN Reason: Pain , Severe (7-10)


Hydroxyurea (Hydroxyurea)  1,500 mg PO QDAY DULCE


Dextrose/Sodium Chloride (D5ns 0.2%)  1,000 mls @ 250 mls/hr IV AS DIRECT DULCE


   Last Admin: 11/10/19 16:18 Dose:  250 mls/hr


   Documented by: 


Miscellaneous Medication (Hydroxyurea [Droxia])  1,500 mg PO DAILY DULCE


Morphine Sulfate (Ms Contin Er)  30 mg PO BID DULCE


Polyethylene Glycol (Miralax 3350)  17 gm PO BID PRN


   PRN Reason: Constipation


Zolpidem Tartrate (Ambien)  10 mg PO QHS PRN


   PRN Reason: Sleep











Review of Systems


Constitutional: fatigue, chronic pain


Respiratory: congestion, home oxygen


Musculoskeletal: low back pain





Exam





- Constitutional


Vitals: 


                                        











Temp Pulse Resp BP Pulse Ox


 


 98.0 F   77   16   116/58   93 


 


 11/10/19 17:26  11/10/19 17:26  11/10/19 17:26  11/10/19 17:26  11/10/19 17:26











General appearance: Present: mild distress, well-nourished





- EENT


Eyes: Present: PERRL


ENT: hearing intact, clear oral mucosa





- Neck


Neck: Present: supple, normal ROM





- Respiratory


Respiratory: bilateral: diminished





- Cardiovascular


Heart Sounds: Present: S1 & S2.  Absent: rub, click





- Extremities


Extremities: pulses symmetrical, No edema


Peripheral Pulses: within normal limits





- Abdominal


General gastrointestinal: Present: soft, non-tender, non-distended, normal bowel

 sounds


Male genitourinary: Present: deferred





- Rectal


Rectal Exam: deferred





- Integumentary


Integumentary: Present: clear, warm, dry





- Musculoskeletal


Musculoskeletal: gait normal, strength equal bilaterally





- Psychiatric


Psychiatric: appropriate mood/affect, intact judgment & insight





- Neurologic


Neurologic: CNII-XII intact, moves all extremities





Results





- Labs


CBC & Chem 7: 


                                11/10/19 Unknown





Labs: 


                              Abnormal lab results











  11/10/19 Range/Units





  Unknown 


 


WBC  12.3 H  (4.5-11.0)  K/mm3


 


RBC  2.07 L  (3.65-5.03)  M/mm3


 


Hgb  7.1 L  (11.8-15.2)  gm/dl


 


Hct  19.1 L*  (35.5-45.6)  %


 


MCH  34 H  (28-32)  pg


 


MCHC  37 H  (32-34)  %


 


RDW  27.9 H  (13.2-15.2)  %


 


Monocytes % (Manual)  21.0 H  (0.0-7.3)  %


 


Nucleated RBC %  10.0 H  (0.0-0.9)  %


 


Monocytes # (Manual)  2.6 H  (0.0-0.8)  K/mm3


 


Percent Retic  14.97 H  (0.78-2.58)  %














Assessment and Plan





- Patient Problems


(1) Sickle cell crisis


Current Visit: Yes   Status: Acute   


Plan to address problem: 


pain control/sxs management.








(2) Sickle cell anemia


Current Visit: Yes   Status: Chronic   


Plan to address problem: 


transfusion replacement therapy, when indicated.








(3) Dehydration


Current Visit: Yes   Status: Acute   


Plan to address problem: 


Hydration

## 2019-11-10 NOTE — XRAY REPORT
CHEST 1 VIEW 



INDICATION / CLINICAL INFORMATION:

sickle cell chest pain.



COMPARISON: 

9/11/2019



FINDINGS:



SUPPORT DEVICES: Right IJ central line stable in position. Stent is present in the SVC.



HEART / MEDIASTINUM: Cardiac silhouette remains mildly enlarged but stable. 



LUNGS / PLEURA: Mild chronic appearing interstitial lung disease. No evidence of pneumonia No pneumot
horax. 



ADDITIONAL FINDINGS: No significant additional findings.



IMPRESSION:

1. Mild cardiomegaly with mild chronic interstitial lung disease.



Signer Name: Karen More MD 

Signed: 11/10/2019 7:03 AM

 Workstation Name: [id number]

## 2019-11-11 LAB
ANISOCYTOSIS BLD QL SMEAR: (no result)
BAND NEUTROPHILS # (MANUAL): 0 K/MM3
BUN SERPL-MCNC: 9 MG/DL (ref 9–20)
BUN/CREAT SERPL: 18 %
CALCIUM SERPL-MCNC: 8.3 MG/DL (ref 8.4–10.2)
HCT VFR BLD CALC: 17.9 % (ref 35.5–45.6)
HEMOLYSIS INDEX: 24
HGB BLD-MCNC: 6.6 GM/DL (ref 11.8–15.2)
HGB S BLD QL SOLY: (no result)
MCHC RBC AUTO-ENTMCNC: 37 % (ref 32–34)
MCV RBC AUTO: 92 FL (ref 84–94)
MYELOCYTES # (MANUAL): 0 K/MM3
PLATELET # BLD: 194 K/MM3 (ref 140–440)
PROMYELOCYTES # (MANUAL): 0 K/MM3
RBC # BLD AUTO: 1.94 M/MM3 (ref 3.65–5.03)
TARGETS BLD QL SMEAR: (no result)
TOTAL CELLS COUNTED BLD: 100

## 2019-11-11 PROCEDURE — 30233N1 TRANSFUSION OF NONAUTOLOGOUS RED BLOOD CELLS INTO PERIPHERAL VEIN, PERCUTANEOUS APPROACH: ICD-10-PCS | Performed by: INTERNAL MEDICINE

## 2019-11-11 RX ADMIN — APIXABAN SCH MG: 2.5 TABLET, FILM COATED ORAL at 09:19

## 2019-11-11 RX ADMIN — HYDROXYUREA SCH MG: 500 CAPSULE ORAL at 09:20

## 2019-11-11 RX ADMIN — DEXTROSE AND SODIUM CHLORIDE SCH MLS/HR: 5; .2 INJECTION, SOLUTION INTRAVENOUS at 19:20

## 2019-11-11 RX ADMIN — ALBUTEROL SULFATE SCH MG: 2.5 SOLUTION RESPIRATORY (INHALATION) at 19:19

## 2019-11-11 RX ADMIN — ALBUTEROL SULFATE SCH MG: 2.5 SOLUTION RESPIRATORY (INHALATION) at 02:18

## 2019-11-11 RX ADMIN — HYDROMORPHONE HYDROCHLORIDE PRN MG: 2 INJECTION, SOLUTION INTRAMUSCULAR; INTRAVENOUS; SUBCUTANEOUS at 17:54

## 2019-11-11 RX ADMIN — ALBUTEROL SULFATE SCH MG: 2.5 SOLUTION RESPIRATORY (INHALATION) at 15:00

## 2019-11-11 RX ADMIN — HYDROMORPHONE HYDROCHLORIDE PRN MG: 2 INJECTION, SOLUTION INTRAMUSCULAR; INTRAVENOUS; SUBCUTANEOUS at 13:00

## 2019-11-11 RX ADMIN — HYDROMORPHONE HYDROCHLORIDE PRN MG: 2 INJECTION, SOLUTION INTRAMUSCULAR; INTRAVENOUS; SUBCUTANEOUS at 09:18

## 2019-11-11 RX ADMIN — MORPHINE SULFATE SCH MG: 30 TABLET, FILM COATED, EXTENDED RELEASE ORAL at 11:21

## 2019-11-11 RX ADMIN — FOLIC ACID SCH MG: 1 TABLET ORAL at 09:19

## 2019-11-11 RX ADMIN — MORPHINE SULFATE SCH MG: 30 TABLET, FILM COATED, EXTENDED RELEASE ORAL at 22:38

## 2019-11-11 RX ADMIN — HYDROMORPHONE HYDROCHLORIDE PRN MG: 2 INJECTION, SOLUTION INTRAMUSCULAR; INTRAVENOUS; SUBCUTANEOUS at 05:52

## 2019-11-11 RX ADMIN — ALBUTEROL SULFATE SCH MG: 2.5 SOLUTION RESPIRATORY (INHALATION) at 08:20

## 2019-11-11 RX ADMIN — DEXTROSE AND SODIUM CHLORIDE SCH MLS/HR: 5; .2 INJECTION, SOLUTION INTRAVENOUS at 05:47

## 2019-11-11 RX ADMIN — HYDROMORPHONE HYDROCHLORIDE PRN MG: 2 INJECTION, SOLUTION INTRAMUSCULAR; INTRAVENOUS; SUBCUTANEOUS at 21:23

## 2019-11-11 RX ADMIN — HYDROMORPHONE HYDROCHLORIDE PRN MG: 2 INJECTION, SOLUTION INTRAMUSCULAR; INTRAVENOUS; SUBCUTANEOUS at 02:20

## 2019-11-11 RX ADMIN — DEXTROSE AND SODIUM CHLORIDE SCH MLS/HR: 5; .2 INJECTION, SOLUTION INTRAVENOUS at 02:20

## 2019-11-11 RX ADMIN — APIXABAN SCH MG: 2.5 TABLET, FILM COATED ORAL at 22:37

## 2019-11-11 NOTE — PROGRESS NOTE
Assessment and Plan





- Patient Problems


(1) Sickle cell crisis


Current Visit: Yes   Status: Acute   


Plan to address problem: 


pain control/sxs management.








(2) Sickle cell anemia


Current Visit: Yes   Status: Chronic   


Plan to address problem: 


transfusion replacement therapy, when indicated.


continue with transfusion replacement therapy.








(3) Dehydration


Current Visit: Yes   Status: Acute   


Plan to address problem: 


Hydration








Subjective


Date of service: 11/11/19


Principal diagnosis: SCD/anemia.pain crisis.


Interval history: 


Patient seen/examined, resting in bed, just finished the 2nd unit of blood 

transfusion.Pain at 7/10.Will continue , with current management.








Objective





- Constitutional


Vitals: 


                               Vital Signs - 12hr











  11/11/19 11/11/19 11/11/19





  09:18 10:35 10:48


 


Temperature  98 F 98.1 F


 


Pulse Rate  82 78


 


Pulse Rate [   





Bilateral]   


 


Respiratory 22 20 22





Rate   


 


Respiratory   





Rate [Bilateral   





]   


 


Blood Pressure  108/62 119/63


 


O2 Sat by Pulse   97





Oximetry   














  11/11/19 11/11/19 11/11/19





  11:20 11:45 12:15


 


Temperature 98.0 F 97.8 F 97.9 F


 


Pulse Rate 78 75 78


 


Pulse Rate [   





Bilateral]   


 


Respiratory 20 20 22





Rate   


 


Respiratory   





Rate [Bilateral   





]   


 


Blood Pressure 118/69 107/64 110/68


 


O2 Sat by Pulse 91  





Oximetry   














  11/11/19 11/11/19 11/11/19





  12:45 15:00 15:15


 


Temperature 98 F 98.2 F 98.1 F


 


Pulse Rate 77 78 81


 


Pulse Rate [   





Bilateral]   


 


Respiratory 20 18 20





Rate   


 


Respiratory   





Rate [Bilateral   





]   


 


Blood Pressure 108/65 116/65 114/59


 


O2 Sat by Pulse   





Oximetry   














  11/11/19 11/11/19 11/11/19





  15:18 15:28 15:45


 


Temperature   98.1 F


 


Pulse Rate   84


 


Pulse Rate [  81 





Bilateral]   


 


Respiratory   20





Rate   


 


Respiratory  20 





Rate [Bilateral   





]   


 


Blood Pressure 114/59  108/57


 


O2 Sat by Pulse   





Oximetry   














  11/11/19 11/11/19 11/11/19





  16:15 16:24 16:45


 


Temperature 98 F 98.0 F 97.4 F L


 


Pulse Rate 78 79 85


 


Pulse Rate [   





Bilateral]   


 


Respiratory 20 20 18





Rate   


 


Respiratory   





Rate [Bilateral   





]   


 


Blood Pressure 112/64 112/64 114/72


 


O2 Sat by Pulse  92 





Oximetry   














  11/11/19 11/11/19 11/11/19





  16:55 17:45 19:21


 


Temperature  98.3 F 


 


Pulse Rate  93 H 


 


Pulse Rate [   





Bilateral]   


 


Respiratory  20 





Rate   


 


Respiratory   





Rate [Bilateral   





]   


 


Blood Pressure 114/72 117/68 


 


O2 Sat by Pulse   96





Oximetry   














  11/11/19





  19:32


 


Temperature 


 


Pulse Rate 


 


Pulse Rate [ 85





Bilateral] 


 


Respiratory 





Rate 


 


Respiratory 18





Rate [Bilateral 





] 


 


Blood Pressure 


 


O2 Sat by Pulse 





Oximetry 











General appearance: Present: mild distress, well-nourished





- EENT


Eyes: PERRL, EOM intact


ENT: hearing intact, clear oral mucosa


Ears: bilateral: normal





- Neck


Neck: supple, normal ROM





- Respiratory


Respiratory effort: normal


Respiratory: bilateral: CTA





- Breasts


Breasts: deferred





- Cardiovascular


Rhythm: regular


Heart Sounds: Present: S1 & S2.  Absent: gallop, rub


Extremities: pulses intact, No edema, normal color, Full ROM





- Gastrointestinal


General gastrointestinal: Present: soft, non-tender, non-distended, normal bowel

sounds


Rectal Exam: deferred





- Genitourinary


Male genitourinary: deferred





- Integumentary


Integumentary: clear, warm, dry





- Musculoskeletal


Musculoskeletal: 1, strength equal bilaterally





- Neurologic


Neurologic: moves all extremities





- Psychiatric


Psychiatric: memory intact, appropriate mood/affect, intact judgment & insight





- Labs


CBC & Chem 7: 


                                11/11/19 Unknown





                                11/11/19 Unknown


Labs: 


                              Abnormal lab results











  11/11/19 11/11/19 11/11/19 Range/Units





  06:46 Unknown Unknown 


 


WBC   13.1 H   (4.5-11.0)  K/mm3


 


RBC   1.94 L   (3.65-5.03)  M/mm3


 


Hgb   6.6 L   (11.8-15.2)  gm/dl


 


Hct   17.9 L*   (35.5-45.6)  %


 


MCH   34 H   (28-32)  pg


 


MCHC   37 H   (32-34)  %


 


RDW   25.4 H   (13.2-15.2)  %


 


Lymphocytes % (Manual)   37.0 H   (13.4-35.0)  %


 


Monocytes % (Manual)   11.0 H   (0.0-7.3)  %


 


Basophils % (Manual)   3.0 H   (0.0-1.8)  %


 


Nucleated RBC %   5.0 H   (0.0-0.9)  %


 


Monocytes # (Manual)   1.4 H   (0.0-0.8)  K/mm3


 


Eosinophils # (Manual)   0.5 H   (0.0-0.4)  K/mm3


 


Basophils # (Manual)   0.4 H   (0.0-0.1)  K/mm3


 


Percent Retic   10.93 H   (0.78-2.58)  %


 


Sodium    135 L  (137-145)  mmol/L


 


Carbon Dioxide    20 L  (22-30)  mmol/L


 


Creatinine    0.5 L  (0.8-1.5)  mg/dL


 


Glucose    131 H  ()  mg/dL


 


Calcium    8.3 L  (8.4-10.2)  mg/dL


 


Crossmatch  See Detail

## 2019-11-12 LAB
HCT VFR BLD CALC: 27.1 % (ref 35.5–45.6)
HGB BLD-MCNC: 9.5 GM/DL (ref 11.8–15.2)

## 2019-11-12 RX ADMIN — HYDROMORPHONE HYDROCHLORIDE PRN MG: 2 INJECTION, SOLUTION INTRAMUSCULAR; INTRAVENOUS; SUBCUTANEOUS at 13:26

## 2019-11-12 RX ADMIN — HYDROMORPHONE HYDROCHLORIDE PRN MG: 2 INJECTION, SOLUTION INTRAMUSCULAR; INTRAVENOUS; SUBCUTANEOUS at 09:23

## 2019-11-12 RX ADMIN — DEXTROSE AND SODIUM CHLORIDE SCH MLS/HR: 5; .2 INJECTION, SOLUTION INTRAVENOUS at 21:58

## 2019-11-12 RX ADMIN — FOLIC ACID SCH MG: 1 TABLET ORAL at 09:25

## 2019-11-12 RX ADMIN — DEXTROSE AND SODIUM CHLORIDE SCH MLS/HR: 5; .2 INJECTION, SOLUTION INTRAVENOUS at 13:56

## 2019-11-12 RX ADMIN — ALBUTEROL SULFATE SCH MG: 2.5 SOLUTION RESPIRATORY (INHALATION) at 01:23

## 2019-11-12 RX ADMIN — MORPHINE SULFATE SCH MG: 30 TABLET, FILM COATED, EXTENDED RELEASE ORAL at 12:06

## 2019-11-12 RX ADMIN — APIXABAN SCH MG: 2.5 TABLET, FILM COATED ORAL at 21:50

## 2019-11-12 RX ADMIN — DEXTROSE AND SODIUM CHLORIDE SCH MLS/HR: 5; .2 INJECTION, SOLUTION INTRAVENOUS at 08:29

## 2019-11-12 RX ADMIN — HYDROXYUREA SCH MG: 500 CAPSULE ORAL at 09:24

## 2019-11-12 RX ADMIN — ALBUTEROL SULFATE SCH MG: 2.5 SOLUTION RESPIRATORY (INHALATION) at 10:01

## 2019-11-12 RX ADMIN — HYDROMORPHONE HYDROCHLORIDE PRN MG: 2 INJECTION, SOLUTION INTRAMUSCULAR; INTRAVENOUS; SUBCUTANEOUS at 06:05

## 2019-11-12 RX ADMIN — HYDROMORPHONE HYDROCHLORIDE PRN MG: 2 INJECTION, SOLUTION INTRAMUSCULAR; INTRAVENOUS; SUBCUTANEOUS at 01:29

## 2019-11-12 RX ADMIN — HYDROMORPHONE HYDROCHLORIDE PRN MG: 2 INJECTION, SOLUTION INTRAMUSCULAR; INTRAVENOUS; SUBCUTANEOUS at 17:53

## 2019-11-12 RX ADMIN — HYDROMORPHONE HYDROCHLORIDE PRN MG: 2 INJECTION, SOLUTION INTRAMUSCULAR; INTRAVENOUS; SUBCUTANEOUS at 21:49

## 2019-11-12 RX ADMIN — MORPHINE SULFATE SCH MG: 30 TABLET, FILM COATED, EXTENDED RELEASE ORAL at 23:05

## 2019-11-12 RX ADMIN — DEXTROSE AND SODIUM CHLORIDE SCH MLS/HR: 5; .2 INJECTION, SOLUTION INTRAVENOUS at 03:31

## 2019-11-12 RX ADMIN — DEXTROSE AND SODIUM CHLORIDE SCH MLS/HR: 5; .2 INJECTION, SOLUTION INTRAVENOUS at 17:59

## 2019-11-12 RX ADMIN — ALBUTEROL SULFATE SCH MG: 2.5 SOLUTION RESPIRATORY (INHALATION) at 19:46

## 2019-11-12 RX ADMIN — APIXABAN SCH MG: 2.5 TABLET, FILM COATED ORAL at 09:25

## 2019-11-12 RX ADMIN — ALBUTEROL SULFATE SCH MG: 2.5 SOLUTION RESPIRATORY (INHALATION) at 14:56

## 2019-11-12 NOTE — PROGRESS NOTE
Assessment and Plan





- Patient Problems


(1) Sickle cell crisis


Current Visit: Yes   Status: Acute   


Plan to address problem: 


pain control/sxs management.








(2) Sickle cell anemia


Current Visit: Yes   Status: Chronic   


Plan to address problem: 


transfusion replacement therapy, when indicated.


continue with transfusion replacement therapy.


s/p transfusion.








(3) Dehydration


Current Visit: Yes   Status: Acute   


Plan to address problem: 


Hydration








Subjective


Date of service: 11/12/19


Principal diagnosis: SCD/anemia.pain crisis.


Interval history: 


Patient seen/examined, resting in bed, just finished the 2nd unit of blood 

transfusion.Pain at 7/10.Will continue , with current management.


patient seen/examined, resting in bed, c/o couhging up greenish sputum., and 

feels  congested in the chest.will start on ABX.





Objective





- Constitutional


Vitals: 


                               Vital Signs - 12hr











  11/12/19 11/12/19 11/12/19





  09:00 09:23 10:00


 


Temperature   


 


Pulse Rate   


 


Pulse Rate [ 84  





Bilateral]   


 


Respiratory  18 





Rate   


 


Respiratory 15  





Rate [Bilateral   





]   


 


Blood Pressure   


 


O2 Sat by Pulse   95





Oximetry   














  11/12/19 11/12/19 11/12/19





  12:18 14:58 15:00


 


Temperature 98.2 F  


 


Pulse Rate 82  


 


Pulse Rate [  88 





Bilateral]   


 


Respiratory 20  





Rate   


 


Respiratory  18 





Rate [Bilateral   





]   


 


Blood Pressure 127/79  


 


O2 Sat by Pulse 97  96





Oximetry   














  11/12/19 11/12/19 11/12/19





  16:56 19:47 19:49


 


Temperature 98.3 F  


 


Pulse Rate 89  


 


Pulse Rate [  92 H 





Bilateral]   


 


Respiratory 20  





Rate   


 


Respiratory  18 





Rate [Bilateral   





]   


 


Blood Pressure 132/71  


 


O2 Sat by Pulse 94  96





Oximetry   














  11/12/19





  20:13


 


Temperature 


 


Pulse Rate 


 


Pulse Rate [ 





Bilateral] 


 


Respiratory 20





Rate 


 


Respiratory 





Rate [Bilateral 





] 


 


Blood Pressure 


 


O2 Sat by Pulse 





Oximetry 











General appearance: Present: mild distress, well-nourished





- EENT


Eyes: PERRL, EOM intact


ENT: hearing intact, clear oral mucosa


Ears: bilateral: normal





- Neck


Neck: supple, normal ROM





- Respiratory


Respiratory: bilateral: diminished, rhonchi





- Breasts


Breasts: deferred, normal





- Cardiovascular


Rhythm: regular


Heart Sounds: Present: S1 & S2.  Absent: gallop, rub


Extremities: pulses intact, No edema, normal color, Full ROM





- Gastrointestinal


General gastrointestinal: Present: soft, non-tender, non-distended, normal bowel

sounds





- Genitourinary


Male genitourinary: deferred





- Integumentary


Integumentary: clear, warm, dry





- Musculoskeletal


Musculoskeletal: 1, strength equal bilaterally





- Neurologic


Neurologic: moves all extremities





- Psychiatric


Psychiatric: memory intact, appropriate mood/affect, intact judgment & insight





- Labs


CBC & Chem 7: 


                                11/12/19 Unknown





                                11/11/19 Unknown


Labs: 


                              Abnormal lab results











  11/11/19 11/12/19 Range/Units





  06:46 Unknown 


 


Hgb   9.5 L  (11.8-15.2)  gm/dl


 


Hct   27.1 L D  (35.5-45.6)  %


 


Crossmatch  See Detail

## 2019-11-13 RX ADMIN — HYDROMORPHONE HYDROCHLORIDE PRN MG: 2 INJECTION, SOLUTION INTRAMUSCULAR; INTRAVENOUS; SUBCUTANEOUS at 09:41

## 2019-11-13 RX ADMIN — APIXABAN SCH MG: 2.5 TABLET, FILM COATED ORAL at 09:42

## 2019-11-13 RX ADMIN — DEXTROSE AND SODIUM CHLORIDE SCH MLS/HR: 5; .2 INJECTION, SOLUTION INTRAVENOUS at 23:01

## 2019-11-13 RX ADMIN — HYDROMORPHONE HYDROCHLORIDE PRN MG: 2 INJECTION, SOLUTION INTRAMUSCULAR; INTRAVENOUS; SUBCUTANEOUS at 03:34

## 2019-11-13 RX ADMIN — DEXTROSE AND SODIUM CHLORIDE SCH MLS/HR: 5; .2 INJECTION, SOLUTION INTRAVENOUS at 18:24

## 2019-11-13 RX ADMIN — FOLIC ACID SCH MG: 1 TABLET ORAL at 09:42

## 2019-11-13 RX ADMIN — DEXTROSE AND SODIUM CHLORIDE SCH MLS/HR: 5; .2 INJECTION, SOLUTION INTRAVENOUS at 10:40

## 2019-11-13 RX ADMIN — MORPHINE SULFATE SCH: 30 TABLET, FILM COATED, EXTENDED RELEASE ORAL at 13:28

## 2019-11-13 RX ADMIN — HYDROMORPHONE HYDROCHLORIDE PRN MG: 2 INJECTION, SOLUTION INTRAMUSCULAR; INTRAVENOUS; SUBCUTANEOUS at 06:37

## 2019-11-13 RX ADMIN — HYDROMORPHONE HYDROCHLORIDE PRN MG: 2 INJECTION, SOLUTION INTRAMUSCULAR; INTRAVENOUS; SUBCUTANEOUS at 21:11

## 2019-11-13 RX ADMIN — DEXTROSE AND SODIUM CHLORIDE SCH MLS/HR: 5; .2 INJECTION, SOLUTION INTRAVENOUS at 06:40

## 2019-11-13 RX ADMIN — DEXTROSE AND SODIUM CHLORIDE SCH MLS/HR: 5; .2 INJECTION, SOLUTION INTRAVENOUS at 14:56

## 2019-11-13 RX ADMIN — DEXTROSE AND SODIUM CHLORIDE SCH MLS/HR: 5; .2 INJECTION, SOLUTION INTRAVENOUS at 02:49

## 2019-11-13 RX ADMIN — ALBUTEROL SULFATE SCH MG: 2.5 SOLUTION RESPIRATORY (INHALATION) at 19:21

## 2019-11-13 RX ADMIN — HYDROMORPHONE HYDROCHLORIDE PRN MG: 2 INJECTION, SOLUTION INTRAMUSCULAR; INTRAVENOUS; SUBCUTANEOUS at 17:08

## 2019-11-13 RX ADMIN — MORPHINE SULFATE SCH MG: 30 TABLET, FILM COATED, EXTENDED RELEASE ORAL at 21:09

## 2019-11-13 RX ADMIN — HYDROMORPHONE HYDROCHLORIDE PRN MG: 2 INJECTION, SOLUTION INTRAMUSCULAR; INTRAVENOUS; SUBCUTANEOUS at 13:36

## 2019-11-13 RX ADMIN — APIXABAN SCH MG: 2.5 TABLET, FILM COATED ORAL at 21:10

## 2019-11-13 RX ADMIN — HYDROXYUREA SCH MG: 500 CAPSULE ORAL at 10:10

## 2019-11-13 RX ADMIN — CEFTRIAXONE SODIUM SCH MLS/HR: 1 INJECTION, POWDER, FOR SOLUTION INTRAMUSCULAR; INTRAVENOUS at 10:09

## 2019-11-13 RX ADMIN — ALBUTEROL SULFATE SCH MG: 2.5 SOLUTION RESPIRATORY (INHALATION) at 14:13

## 2019-11-13 RX ADMIN — ALBUTEROL SULFATE SCH MG: 2.5 SOLUTION RESPIRATORY (INHALATION) at 07:49

## 2019-11-14 VITALS — SYSTOLIC BLOOD PRESSURE: 115 MMHG | DIASTOLIC BLOOD PRESSURE: 61 MMHG

## 2019-11-14 RX ADMIN — HYDROMORPHONE HYDROCHLORIDE PRN MG: 2 INJECTION, SOLUTION INTRAMUSCULAR; INTRAVENOUS; SUBCUTANEOUS at 08:18

## 2019-11-14 RX ADMIN — HYDROMORPHONE HYDROCHLORIDE PRN MG: 2 INJECTION, SOLUTION INTRAMUSCULAR; INTRAVENOUS; SUBCUTANEOUS at 00:33

## 2019-11-14 RX ADMIN — APIXABAN SCH MG: 2.5 TABLET, FILM COATED ORAL at 09:05

## 2019-11-14 RX ADMIN — HYDROMORPHONE HYDROCHLORIDE PRN MG: 2 INJECTION, SOLUTION INTRAMUSCULAR; INTRAVENOUS; SUBCUTANEOUS at 11:26

## 2019-11-14 RX ADMIN — MORPHINE SULFATE SCH MG: 30 TABLET, FILM COATED, EXTENDED RELEASE ORAL at 09:06

## 2019-11-14 RX ADMIN — ALBUTEROL SULFATE SCH MG: 2.5 SOLUTION RESPIRATORY (INHALATION) at 08:20

## 2019-11-14 RX ADMIN — HYDROXYUREA SCH MG: 500 CAPSULE ORAL at 09:05

## 2019-11-14 RX ADMIN — CEFTRIAXONE SODIUM SCH MLS/HR: 1 INJECTION, POWDER, FOR SOLUTION INTRAMUSCULAR; INTRAVENOUS at 09:05

## 2019-11-14 RX ADMIN — FOLIC ACID SCH MG: 1 TABLET ORAL at 09:06

## 2019-11-14 RX ADMIN — DEXTROSE AND SODIUM CHLORIDE SCH MLS/HR: 5; .2 INJECTION, SOLUTION INTRAVENOUS at 05:08

## 2019-11-14 RX ADMIN — HYDROMORPHONE HYDROCHLORIDE PRN MG: 2 INJECTION, SOLUTION INTRAMUSCULAR; INTRAVENOUS; SUBCUTANEOUS at 05:09

## 2019-11-14 RX ADMIN — DEXTROSE AND SODIUM CHLORIDE SCH MLS/HR: 5; .2 INJECTION, SOLUTION INTRAVENOUS at 08:18

## 2019-11-14 NOTE — PROGRESS NOTE
Assessment and Plan





- Patient Problems


(1) Sickle cell crisis


Current Visit: Yes   Status: Resolved   


Plan to address problem: 


pain control/sxs management.








(2) Sickle cell anemia


Current Visit: Yes   Status: Chronic   


Plan to address problem: 


transfusion replacement therapy, when indicated.


continue with transfusion replacement therapy.


s/p transfusion.








(3) Dehydration


Current Visit: Yes   Status: Resolved   


Plan to address problem: 


Hydration








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


Current Visit: Yes   Status: Chronic   





Subjective


Date of service: 11/14/19


Principal diagnosis: SCD/anemia.pain crisis.


Interval history: 


Patient seen/examined, resting in bed, just finished the 2nd unit of blood 

transfusion.Pain at 7/10.Will continue , with current management.


patient seen/examined, resting in bed, c/o couhging up greenish sputum., and 

feels  congested in the chest.will start on ABX.


Patient seen, no complaints as per him, Discharge process completed he is 

awaiting  his ride. He has all his meds.





Objective





- Constitutional


Vitals: 


                               Vital Signs - 12hr











  11/14/19 11/14/19 11/14/19





  05:02 08:00 08:22


 


Temperature 98.3 F  


 


Pulse Rate 84  


 


Pulse Rate [  92 H 





Bilateral]   


 


Respiratory 18  





Rate   


 


Respiratory  20 





Rate [Bilateral   





]   


 


Blood Pressure 116/64  


 


O2 Sat by Pulse 92  92





Oximetry   














  11/14/19





  12:00


 


Temperature 98.0 F


 


Pulse Rate 87


 


Pulse Rate [ 





Bilateral] 


 


Respiratory 18





Rate 


 


Respiratory 





Rate [Bilateral 





] 


 


Blood Pressure 115/61


 


O2 Sat by Pulse 96





Oximetry 











General appearance: Present: no acute distress, well-nourished





- EENT


Eyes: PERRL, EOM intact


ENT: hearing intact, clear oral mucosa


Ears: bilateral: normal





- Neck


Neck: supple, normal ROM





- Respiratory


Respiratory effort: normal


Respiratory: bilateral: CTA





- Breasts


Breasts: deferred





- Cardiovascular


Rhythm: regular


Heart Sounds: Present: S1 & S2.  Absent: gallop, rub


Extremities: pulses intact, No edema, normal color, Full ROM





- Gastrointestinal


General gastrointestinal: Present: soft, non-tender, non-distended, normal bowel

sounds


Rectal Exam: deferred





- Genitourinary


Male genitourinary: deferred





- Integumentary


Integumentary: clear, warm, dry





- Musculoskeletal


Musculoskeletal: 1, strength equal bilaterally





- Neurologic


Neurologic: moves all extremities





- Psychiatric


Psychiatric: memory intact, appropriate mood/affect, intact judgment & insight





- Labs


CBC & Chem 7: 


                                11/12/19 Unknown





                                11/11/19 Unknown

## 2019-12-10 ENCOUNTER — HOSPITAL ENCOUNTER (INPATIENT)
Dept: HOSPITAL 5 - ED | Age: 29
LOS: 2 days | Discharge: HOME | DRG: 812 | End: 2019-12-12
Attending: INTERNAL MEDICINE | Admitting: INTERNAL MEDICINE
Payer: MEDICARE

## 2019-12-10 DIAGNOSIS — D72.829: ICD-10-CM

## 2019-12-10 DIAGNOSIS — R07.9: ICD-10-CM

## 2019-12-10 DIAGNOSIS — D57.00: Primary | ICD-10-CM

## 2019-12-10 DIAGNOSIS — J45.909: ICD-10-CM

## 2019-12-10 DIAGNOSIS — M54.9: ICD-10-CM

## 2019-12-10 DIAGNOSIS — Z79.899: ICD-10-CM

## 2019-12-10 DIAGNOSIS — E86.0: ICD-10-CM

## 2019-12-10 DIAGNOSIS — E87.5: ICD-10-CM

## 2019-12-10 DIAGNOSIS — Z90.49: ICD-10-CM

## 2019-12-10 LAB
BUN SERPL-MCNC: 11 MG/DL (ref 9–20)
BUN/CREAT SERPL: 28 %
CALCIUM SERPL-MCNC: 8.6 MG/DL (ref 8.4–10.2)
HCT VFR BLD CALC: 24.6 % (ref 35.5–45.6)
HEMOLYSIS INDEX: 123
HGB BLD-MCNC: 8.8 GM/DL (ref 11.8–15.2)
MCHC RBC AUTO-ENTMCNC: 36 % (ref 32–34)
MCV RBC AUTO: 92 FL (ref 84–94)
PLATELET # BLD: 223 K/MM3 (ref 140–440)
RBC # BLD AUTO: 2.68 M/MM3 (ref 3.65–5.03)

## 2019-12-10 PROCEDURE — 80048 BASIC METABOLIC PNL TOTAL CA: CPT

## 2019-12-10 PROCEDURE — 85007 BL SMEAR W/DIFF WBC COUNT: CPT

## 2019-12-10 PROCEDURE — 71046 X-RAY EXAM CHEST 2 VIEWS: CPT

## 2019-12-10 PROCEDURE — 96374 THER/PROPH/DIAG INJ IV PUSH: CPT

## 2019-12-10 PROCEDURE — 87116 MYCOBACTERIA CULTURE: CPT

## 2019-12-10 PROCEDURE — 85045 AUTOMATED RETICULOCYTE COUNT: CPT

## 2019-12-10 PROCEDURE — 85025 COMPLETE CBC W/AUTO DIFF WBC: CPT

## 2019-12-10 PROCEDURE — 36415 COLL VENOUS BLD VENIPUNCTURE: CPT

## 2019-12-10 RX ADMIN — HYDROMORPHONE HYDROCHLORIDE SCH: 1 INJECTION, SOLUTION INTRAMUSCULAR; INTRAVENOUS; SUBCUTANEOUS at 23:54

## 2019-12-10 NOTE — XRAY REPORT
CHEST 2 VIEWS 



INDICATION / CLINICAL INFORMATION:

pain.



COMPARISON: 

11/10/2019



FINDINGS:



SUPPORT DEVICES: Right port catheter tip is in a location consistent with the superior vena cava

HEART / MEDIASTINUM: Heart size is normal. Vascular stent overlies the expected location of the super
ior vena cava 

LUNGS / PLEURA: No significant pulmonary or pleural abnormality. No pneumothorax. 



ADDITIONAL FINDINGS: No significant additional findings.



IMPRESSION:

1. No acute findings.



Signer Name: Miguel A Coleman MD 

Signed: 12/10/2019 8:28 PM

 Workstation Name: [id number]

## 2019-12-10 NOTE — EVENT NOTE
ED Screening Note


Date of service: 12/10/19


Time: 19:51


ED Screening Note: 





29 y o presents with chest and back pain from sickle cell 


This initial assessment/diagnostic orders/clinical plan/treatment(s) is/are 

subject to change based on patients health status, clinical progression and 

re-assessment by fellow clinical providers in the ED. Further treatment and 

workup at subsequent clinical providers discretion. Patient/guardian urged not 

to elope from the ED as their condition may be serious if not clinically 

assessed and managed. 





Initial orders include: 


cbc,bmp,retic

## 2019-12-10 NOTE — EMERGENCY DEPARTMENT REPORT
ED General Adult HPI





- General


Chief complaint: Sickle Cell Crisis


Stated complaint: SICKEL CELL PAIN


Time Seen by Provider: 12/10/19 19:51


Source: patient


Mode of arrival: Ambulatory


Limitations: No Limitations





- History of Present Illness


Initial comments: 





Patient is 29 years old male with history of sickle cell disease.  Patient 

presented to the ER complaining of generalized body pain, chest pain and back 

pain started this morning.  Patient denied any fever or chills.  Patient also 

denied any cough or shortness of breath.





- Related Data


                                Home Medications











 Medication  Instructions  Recorded  Confirmed  Last Taken


 


Folic Acid [Folvite] 1 mg PO DAILY 06/21/18 10/09/19 09/10/19


 


Morphine ER [Ms Contin ER] 30 mg PO BID 06/21/18 10/09/19 09/10/19


 


Zolpidem [Ambien] 10 mg PO QHS PRN 08/12/19 10/09/19 09/10/19








                                  Previous Rx's











 Medication  Instructions  Recorded  Last Taken  Type


 


Hydroxyurea [Droxia] 1,500 mg PO DAILY #30 capsule 02/24/18 09/10/19 Rx


 


Oxycodone HCl/Acetaminophen 1 tab PO Q4-6H PRN #6 tablet 05/05/19 09/10/19 Rx





[Percocet 10/325 mg]    


 


Apixaban [Eliquis] 2.5 mg PO BID #60 tablet 06/10/19 09/10/19 Rx











                                    Allergies











Allergy/AdvReac Type Severity Reaction Status Date / Time


 


No Known Allergies Allergy   Verified 08/12/19 14:45














ED Review of Systems


ROS: 


Stated complaint: SICKEL CELL PAIN


Other details as noted in HPI





Comment: All other systems reviewed and negative


Constitutional: denies: chills, fever


Respiratory: denies: cough, shortness of breath, SOB with exertion


Cardiovascular: chest pain


Gastrointestinal: denies: abdominal pain, nausea, vomiting, diarrhea, 

constipation, hematemesis, melena, hematochezia


Musculoskeletal: back pain, arthralgia, myalgia.  denies: joint swelling


Neurological: denies: headache, weakness, numbness, paresthesias, confusion





ED Past Medical Hx





- Past Medical History


Previous Medical History?: Yes


Hx Hypertension: No


Hx CVA: No


Hx Heart Attack/AMI: No


Hx Congestive Heart Failure: No


Hx Diabetes: No


Hx Deep Vein Thrombosis: No


Hx Pulmonary Embolism: No


Hx GERD: No


Hx Liver Disease: No


Hx Renal Disease: No


Hx Sickle Cell Disease: Yes


Hx Arthritis: No


Hx Headaches / Migraines: No


Hx Seizures: No


Hx Kidney Stones: No


Hx Psychiatric Treatment: No


Hx Asthma: Yes (denies hx exacerbations)


Hx COPD: No


Hx Tuberculosis: No


Hx Dementia: No


Hx HIV: No


Additional medical history: Acute chest syndrome,





- Surgical History


Hx Coronary Stent: No


Hx Open Heart Surgery: No


Hx Pacemaker: No


Hx Internal Defibrillator: No


Hx Cholecystectomy: Yes


Hx Appendectomy: No


Hx Breast Surgery: No


Additional Surgical History: TRACHEOSTOMY.  2 PORTS AND REMOVAL.  LIVER BIOPSY 

X2





- Social History


Smoking Status: Never Smoker


Substance Use Type: None





- Medications


Home Medications: 


                                Home Medications











 Medication  Instructions  Recorded  Confirmed  Last Taken  Type


 


Hydroxyurea [Droxia] 1,500 mg PO DAILY #30 capsule 02/24/18 10/09/19 09/10/19 Rx


 


Folic Acid [Folvite] 1 mg PO DAILY 06/21/18 10/09/19 09/10/19 History


 


Morphine ER [Ms Contin ER] 30 mg PO BID 06/21/18 10/09/19 09/10/19 History


 


Oxycodone HCl/Acetaminophen 1 tab PO Q4-6H PRN #6 tablet 05/05/19 10/09/19 

09/10/19 Rx





[Percocet 10/325 mg]     


 


Apixaban [Eliquis] 2.5 mg PO BID #60 tablet 06/10/19 10/09/19 09/10/19 Rx


 


Zolpidem [Ambien] 10 mg PO QHS PRN 08/12/19 10/09/19 09/10/19 History














ED Physical Exam





- General


Limitations: No Limitations


General appearance: alert, in no apparent distress





- Head


Head exam: Present: atraumatic, normocephalic, normal inspection





- Eye


Eye exam: Present: normal appearance





- ENT


ENT exam: Present: normal exam, normal orophraynx, mucous membranes moist





- Neck


Neck exam: Present: normal inspection, full ROM.  Absent: tenderness, 

meningismus, lymphadenopathy, thyromegaly





- Respiratory


Respiratory exam: Present: normal lung sounds bilaterally





- Cardiovascular


Cardiovascular Exam: Present: regular rate, normal rhythm, normal heart sounds





- GI/Abdominal


GI/Abdominal exam: Present: soft, normal bowel sounds.  Absent: distended, 

tenderness, guarding, rebound, rigid, organomegaly, mass, bruit, pulsatile mass,

 hernia





- Extremities Exam


Extremities exam: Present: normal inspection, full ROM, normal capillary refill.

  Absent: tenderness, pedal edema, joint swelling, calf tenderness





- Back Exam


Back exam: Present: normal inspection, full ROM.  Absent: CVA tenderness (R), 

CVA tenderness (L), muscle spasm, paraspinal tenderness, vertebral tenderness





- Neurological Exam


Neurological exam: Present: alert, oriented X3, CN II-XII intact, normal gait, 

reflexes normal





- Skin


Skin exam: Present: warm, intact, normal color





ED Course


                                   Vital Signs











  12/10/19 12/10/19 12/10/19





  19:50 21:30 21:48


 


Temperature 98.4 F  


 


Pulse Rate 83 80 92 H


 


Respiratory 20 16 24





Rate   


 


Blood Pressure 112/75  


 


Blood Pressure  125/74 





[Left]   


 


O2 Sat by Pulse 92 96 86





Oximetry   














  12/10/19





  22:25


 


Temperature 


 


Pulse Rate 


 


Respiratory 





Rate 


 


Blood Pressure 


 


Blood Pressure 





[Left] 


 


O2 Sat by Pulse 88





Oximetry 














ED Medical Decision Making





- Lab Data


Result diagrams: 


                                 12/10/19 21:26





                                 12/10/19 21:26





- Radiology Data


Radiology results: report reviewed





- Medical Decision Making





Patient is 29 years old male with history of sickle cell disease.  Patient 

presented to the ER complaining of generalized body pain, chest pain and back 

pain started this morning.  Patient denied any fever or chills.  Patient also 

denied any cough or shortness of breath.





Patient received 2 L of normal saline, morphine and Zofran.  Patient found to be

 in acute sickle cell crisis with a reticulocyte count of 22.  Patient discussed

 with Dr. Gray who agreed to admit the patient to hospital for further 

management.





Critical Care Time: Yes


Critical care time in (mins) excluding proc time.: 30


Critical care attestation.: 


If time is entered above; I have spent that time in minutes in the direct care 

of this critically ill patient, excluding procedure time.








ED Disposition


Clinical Impression: 


 Sickle cell pain crisis, Chest pain, Leukocytosis, Back pain





Disposition: DC-09 OP ADMIT IP TO THIS HOSP


Is pt being admited?: Yes


Condition: Stable


Instructions:  Chest Pain (ED)


Referrals: 


PRIMARY CARE,MD [Primary Care Provider] - 3-5 Days

## 2019-12-11 LAB
ANISOCYTOSIS BLD QL SMEAR: (no result)
BAND NEUTROPHILS # (MANUAL): 0 K/MM3
HGB S BLD QL SOLY: (no result)
MYELOCYTES # (MANUAL): 0 K/MM3
PROMYELOCYTES # (MANUAL): 0 K/MM3
TOTAL CELLS COUNTED BLD: 100

## 2019-12-11 RX ADMIN — DEXTROSE AND SODIUM CHLORIDE SCH MLS/HR: 5; .2 INJECTION, SOLUTION INTRAVENOUS at 15:15

## 2019-12-11 RX ADMIN — DEXTROSE AND SODIUM CHLORIDE SCH MLS/HR: 5; .2 INJECTION, SOLUTION INTRAVENOUS at 00:04

## 2019-12-11 RX ADMIN — HYDROMORPHONE HYDROCHLORIDE SCH MG: 1 INJECTION, SOLUTION INTRAMUSCULAR; INTRAVENOUS; SUBCUTANEOUS at 09:31

## 2019-12-11 RX ADMIN — APIXABAN SCH MG: 2.5 TABLET, FILM COATED ORAL at 12:40

## 2019-12-11 RX ADMIN — HYDROMORPHONE HYDROCHLORIDE SCH MG: 1 INJECTION, SOLUTION INTRAMUSCULAR; INTRAVENOUS; SUBCUTANEOUS at 15:17

## 2019-12-11 RX ADMIN — DEXTROSE AND SODIUM CHLORIDE SCH MLS/HR: 5; .2 INJECTION, SOLUTION INTRAVENOUS at 05:53

## 2019-12-11 RX ADMIN — HYDROXYUREA SCH MG: 500 CAPSULE ORAL at 12:40

## 2019-12-11 RX ADMIN — HYDROMORPHONE HYDROCHLORIDE SCH MG: 1 INJECTION, SOLUTION INTRAMUSCULAR; INTRAVENOUS; SUBCUTANEOUS at 17:56

## 2019-12-11 RX ADMIN — APIXABAN SCH MG: 2.5 TABLET, FILM COATED ORAL at 21:21

## 2019-12-11 RX ADMIN — HYDROMORPHONE HYDROCHLORIDE SCH MG: 1 INJECTION, SOLUTION INTRAMUSCULAR; INTRAVENOUS; SUBCUTANEOUS at 02:46

## 2019-12-11 RX ADMIN — DEXTROSE AND SODIUM CHLORIDE SCH MLS/HR: 5; .2 INJECTION, SOLUTION INTRAVENOUS at 18:00

## 2019-12-11 RX ADMIN — HYDROMORPHONE HYDROCHLORIDE SCH MG: 1 INJECTION, SOLUTION INTRAMUSCULAR; INTRAVENOUS; SUBCUTANEOUS at 05:54

## 2019-12-11 RX ADMIN — DEXTROSE AND SODIUM CHLORIDE SCH MLS/HR: 5; .2 INJECTION, SOLUTION INTRAVENOUS at 09:43

## 2019-12-11 RX ADMIN — HYDROMORPHONE HYDROCHLORIDE SCH MG: 1 INJECTION, SOLUTION INTRAMUSCULAR; INTRAVENOUS; SUBCUTANEOUS at 12:41

## 2019-12-11 RX ADMIN — HYDROMORPHONE HYDROCHLORIDE SCH MG: 1 INJECTION, SOLUTION INTRAMUSCULAR; INTRAVENOUS; SUBCUTANEOUS at 21:21

## 2019-12-11 NOTE — HISTORY AND PHYSICAL REPORT
History of Present Illness


Date of examination: 12/11/19


Date of admission: 


12/10/19 23:11





Chief complaint: 





Diffuse joint pain.


History of present illness: 


Patient presented to the Er with CC of overall body ache/SOB. He was not getting

pain control at home, and hence, presented to the ER. He was seen by the ED Doc 

and admitted ,for sxs management, and control.Once stable, will D/c home.He will

be hydrated, pain control, and hydrated.








Past History


Past Medical History: anemia


Past Surgical History: No surgical history


Social history: no significant social history, single, lives with family


Family history: no significant family history





Medications and Allergies


                                    Allergies











Allergy/AdvReac Type Severity Reaction Status Date / Time


 


No Known Allergies Allergy   Verified 08/12/19 14:45











                                Home Medications











 Medication  Instructions  Recorded  Confirmed  Last Taken  Type


 


Hydroxyurea [Droxia] 1,500 mg PO DAILY #30 capsule 02/24/18 12/10/19 09/10/19 Rx


 


Folic Acid [Folvite] 1 mg PO DAILY 06/21/18 12/10/19 09/10/19 History


 


Morphine ER [Ms Contin ER] 30 mg PO BID 06/21/18 12/10/19 09/10/19 History


 


Oxycodone HCl/Acetaminophen 1 tab PO Q4-6H PRN #6 tablet 05/05/19 12/10/19 

09/10/19 Rx





[Percocet 10/325 mg]     


 


Apixaban [Eliquis] 2.5 mg PO BID #60 tablet 06/10/19 12/10/19 09/10/19 Rx


 


Zolpidem [Ambien] 10 mg PO QHS PRN 08/12/19 12/10/19 09/10/19 History











Active Meds: 


Active Medications





Apixaban (Eliquis)  2.5 mg PO BID [facility]; Protocol


   Last Admin: 12/11/19 21:21 Dose:  2.5 mg


   Documented by: 


Diphenhydramine HCl (Benadryl)  12.5 mg IV Q3H [facility]


   Last Admin: 12/11/19 21:20 Dose:  12.5 mg


   Documented by: 


Folic Acid (Folvite)  1 mg PO QDAY [facility]


   Last Admin: 12/11/19 12:40 Dose:  1 mg


   Documented by: 


Folic Acid (Folvite)  1 mg PO DAILY [facility]


Hydromorphone HCl (Dilaudid)  3 mg IV Q3H [facility]


   Last Admin: 12/11/19 21:21 Dose:  3 mg


   Documented by: 


Hydroxyurea (Hydroxyurea)  1,500 mg PO QDAY [facility]


   Last Admin: 12/11/19 12:40 Dose:  1,500 mg


   Documented by: 


Dextrose/Sodium Chloride (D5ns 0.2%)  1,000 mls @ 250 mls/hr IV AS DIRECT [facility]


   Last Admin: 12/11/19 18:00 Dose:  250 mls/hr


   Documented by: 


Oxycodone HCl (Roxicodone)  10 mg PO Q6H PRN


   PRN Reason: Pain, Moderate (4-6)











Review of Systems


Constitutional: chronic pain





Exam





- Constitutional


Vitals: 


                                        











Temp Pulse Resp BP Pulse Ox


 


 97.5 F L  71   22   107/70   99 


 


 12/11/19 16:25  12/11/19 16:25  12/11/19 16:25  12/11/19 16:25  12/11/19 16:25











General appearance: Present: mild distress, well-nourished





- EENT


Eyes: Present: PERRL


ENT: hearing intact, clear oral mucosa





- Neck


Neck: Present: supple, normal ROM





- Respiratory


Respiratory: bilateral: diminished





- Cardiovascular


Heart Sounds: Present: S1 & S2.  Absent: rub, click





- Extremities


Extremities: pulses symmetrical, No edema


Peripheral Pulses: within normal limits





- Abdominal


General gastrointestinal: Present: soft, non-tender, non-distended, normal bowel

 sounds


Male genitourinary: Present: deferred





- Rectal


Rectal Exam: deferred





- Integumentary


Integumentary: Present: clear, warm, dry





- Musculoskeletal


Musculoskeletal: gait normal, strength equal bilaterally





- Psychiatric


Psychiatric: appropriate mood/affect, intact judgment & insight





- Neurologic


Neurologic: CNII-XII intact, moves all extremities





Results





- Labs


CBC & Chem 7: 


                                 12/10/19 21:26





                                 12/10/19 21:26


Labs: 


                              Abnormal lab results











  12/10/19 Range/Units





  21:26 


 


Monocytes % (Manual)  8.0 H  (0.0-7.3)  %


 


Nucleated RBC %  6.0 H  (0.0-0.9)  %


 


Seg Neutrophils # Man  8.4 H  (1.8-7.7)  K/mm3


 


Monocytes # (Manual)  1.1 H  (0.0-0.8)  K/mm3














Assessment and Plan





- Patient Problems


(1) Chest pain


Current Visit: Yes   Status: Chronic   


Plan to address problem: 


Pain control, oxygen.








(2) Back pain


Current Visit: Yes   Status: Chronic   


Plan to address problem: 


Pain control.








(3) Sickle cell pain crisis


Current Visit: Yes   Status: Acute   


Plan to address problem: 


pain control.








(4) Leukocytosis


Current Visit: Yes   Status: Acute   


Plan to address problem: 


recheck cbc in am.








(5) Dehydration


Current Visit: Yes   Status: Acute   


Plan to address problem: 


Hydration.

## 2019-12-12 VITALS — DIASTOLIC BLOOD PRESSURE: 68 MMHG | SYSTOLIC BLOOD PRESSURE: 110 MMHG

## 2019-12-12 LAB
ANISOCYTOSIS BLD QL SMEAR: (no result)
BAND NEUTROPHILS # (MANUAL): 0 K/MM3
BUN SERPL-MCNC: 8 MG/DL (ref 9–20)
BUN/CREAT SERPL: 20 %
CALCIUM SERPL-MCNC: 8.4 MG/DL (ref 8.4–10.2)
HCT VFR BLD CALC: 22.1 % (ref 35.5–45.6)
HEMOLYSIS INDEX: 19
HGB BLD-MCNC: 8.1 GM/DL (ref 11.8–15.2)
HGB S BLD QL SOLY: (no result)
MCHC RBC AUTO-ENTMCNC: 37 % (ref 32–34)
MCV RBC AUTO: 91 FL (ref 84–94)
MYELOCYTES # (MANUAL): 0 K/MM3
PLATELET # BLD: 186 K/MM3 (ref 140–440)
PROMYELOCYTES # (MANUAL): 0 K/MM3
RBC # BLD AUTO: 2.43 M/MM3 (ref 3.65–5.03)
TARGETS BLD QL SMEAR: (no result)
TOTAL CELLS COUNTED BLD: 100

## 2019-12-12 RX ADMIN — DEXTROSE AND SODIUM CHLORIDE SCH MLS/HR: 5; .2 INJECTION, SOLUTION INTRAVENOUS at 04:13

## 2019-12-12 RX ADMIN — HYDROMORPHONE HYDROCHLORIDE SCH MG: 1 INJECTION, SOLUTION INTRAMUSCULAR; INTRAVENOUS; SUBCUTANEOUS at 00:40

## 2019-12-12 RX ADMIN — DEXTROSE AND SODIUM CHLORIDE SCH MLS/HR: 5; .2 INJECTION, SOLUTION INTRAVENOUS at 08:15

## 2019-12-12 RX ADMIN — DEXTROSE AND SODIUM CHLORIDE SCH MLS/HR: 5; .2 INJECTION, SOLUTION INTRAVENOUS at 12:19

## 2019-12-12 RX ADMIN — HYDROMORPHONE HYDROCHLORIDE SCH MG: 1 INJECTION, SOLUTION INTRAMUSCULAR; INTRAVENOUS; SUBCUTANEOUS at 18:06

## 2019-12-12 RX ADMIN — DEXTROSE AND SODIUM CHLORIDE SCH MLS/HR: 5; .2 INJECTION, SOLUTION INTRAVENOUS at 16:43

## 2019-12-12 RX ADMIN — HYDROMORPHONE HYDROCHLORIDE SCH: 1 INJECTION, SOLUTION INTRAMUSCULAR; INTRAVENOUS; SUBCUTANEOUS at 12:20

## 2019-12-12 RX ADMIN — HYDROMORPHONE HYDROCHLORIDE SCH MG: 1 INJECTION, SOLUTION INTRAMUSCULAR; INTRAVENOUS; SUBCUTANEOUS at 10:03

## 2019-12-12 RX ADMIN — HYDROMORPHONE HYDROCHLORIDE SCH MG: 1 INJECTION, SOLUTION INTRAMUSCULAR; INTRAVENOUS; SUBCUTANEOUS at 06:54

## 2019-12-12 RX ADMIN — DEXTROSE AND SODIUM CHLORIDE SCH MLS/HR: 5; .2 INJECTION, SOLUTION INTRAVENOUS at 00:40

## 2019-12-12 RX ADMIN — HYDROXYUREA SCH MG: 500 CAPSULE ORAL at 10:04

## 2019-12-12 RX ADMIN — HYDROMORPHONE HYDROCHLORIDE SCH MG: 1 INJECTION, SOLUTION INTRAMUSCULAR; INTRAVENOUS; SUBCUTANEOUS at 04:12

## 2019-12-12 RX ADMIN — HYDROMORPHONE HYDROCHLORIDE SCH MG: 1 INJECTION, SOLUTION INTRAMUSCULAR; INTRAVENOUS; SUBCUTANEOUS at 14:04

## 2019-12-12 NOTE — DISCHARGE SUMMARY
Providers





- Providers


Date of Admission: 


12/10/19 23:11





Date of discharge: 12/12/19


Attending physician: 


DARLENE KIM





Primary care physician: 


PRIMARY CARE MD








Hospitalization


Reason for admission: SCD/Pain crisis, anemia.


Condition: Stable


Hospital course: 





Patient presented , to the ER with CC of diffuse joint pain, weakness.He was 

seen/examined, and admitted, for sxs management/control. He had hydration, and 

pain control, and  seen/examined today, c/o feels better. I will d/c him home.


Disposition: DC-01 TO HOME OR SELFCARE





- Discharge Diagnoses


(1) Chest pain


Status: Resolved   





(2) Back pain


Status: Resolved   





(3) Sickle cell pain crisis


Status: Resolved   





(4) Leukocytosis


Status: Resolved   





(5) Dehydration


Status: Resolved   





Core Measure Documentation





- Palliative Care


Palliative Care/ Comfort Measures: Not Applicable





- Core Measures


Any of the following diagnoses?: history only





Exam





- Constitutional


Vitals: 


                                        











Temp Pulse Resp BP Pulse Ox


 


 97.8 F   76   24   114/68   98 


 


 12/12/19 11:21  12/12/19 14:02  12/12/19 11:21  12/12/19 14:02  12/12/19 11:21











General appearance: Present: no acute distress, well-nourished





- EENT


Eyes: Present: PERRL


ENT: hearing intact, clear oral mucosa





- Neck


Neck: Present: supple, normal ROM





- Respiratory


Respiratory effort: normal


Respiratory: bilateral: CTA





- Cardiovascular


Heart Sounds: Present: S1 & S2.  Absent: rub, click





- Extremities


Extremities: pulses symmetrical, No edema


Peripheral Pulses: within normal limits





- Abdominal


General gastrointestinal: Present: soft, non-tender, non-distended, normal bowel

sounds


Male genitourinary: Present: deferred





- Rectal


Rectal Exam: deferred





- Integumentary


Integumentary: Present: clear, warm, dry





- Musculoskeletal


Musculoskeletal: gait normal, strength equal bilaterally





- Psychiatric


Psychiatric: appropriate mood/affect, intact judgment & insight





- Neurologic


Neurologic: CNII-XII intact, moves all extremities





Plan


Activity: no restrictions


Diet: regular


Follow up with: 


PRIMARY CARE,MD [Primary Care Provider] - 3-5 Days


DARLENE KIM DO [Staff Physician] - 7 Days

## 2020-02-27 ENCOUNTER — HOSPITAL ENCOUNTER (INPATIENT)
Dept: HOSPITAL 5 - ED | Age: 30
LOS: 5 days | Discharge: HOME | DRG: 812 | End: 2020-03-03
Attending: INTERNAL MEDICINE | Admitting: INTERNAL MEDICINE
Payer: MEDICARE

## 2020-02-27 DIAGNOSIS — D57.00: Primary | ICD-10-CM

## 2020-02-27 DIAGNOSIS — Z90.49: ICD-10-CM

## 2020-02-27 DIAGNOSIS — Z79.899: ICD-10-CM

## 2020-02-27 DIAGNOSIS — E86.0: ICD-10-CM

## 2020-02-27 LAB
ALBUMIN SERPL-MCNC: 4.2 G/DL (ref 3.9–5)
ALT SERPL-CCNC: 18 UNITS/L (ref 7–56)
ANISOCYTOSIS BLD QL SMEAR: (no result)
BAND NEUTROPHILS # (MANUAL): 0 K/MM3
BILIRUB DIRECT SERPL-MCNC: 2.1 MG/DL (ref 0–0.2)
BILIRUB UR QL STRIP: (no result)
BLOOD UR QL VISUAL: (no result)
BUN SERPL-MCNC: 5 MG/DL (ref 9–20)
BUN/CREAT SERPL: 13 %
CALCIUM SERPL-MCNC: 8.6 MG/DL (ref 8.4–10.2)
HCT VFR BLD CALC: 21.3 % (ref 35.5–45.6)
HEMOLYSIS INDEX: 17
HGB BLD-MCNC: 7.7 GM/DL (ref 11.8–15.2)
HGB S BLD QL SOLY: (no result)
MCHC RBC AUTO-ENTMCNC: 36 % (ref 32–34)
MCV RBC AUTO: 96 FL (ref 84–94)
MUCOUS THREADS #/AREA URNS HPF: (no result) /HPF
MYELOCYTES # (MANUAL): 0 K/MM3
PH UR STRIP: 6 [PH] (ref 5–7)
PLATELET # BLD: 236 K/MM3 (ref 140–440)
PROMYELOCYTES # (MANUAL): 0 K/MM3
RBC # BLD AUTO: 2.22 M/MM3 (ref 3.65–5.03)
RBC #/AREA URNS HPF: 1 /HPF (ref 0–6)
TOTAL CELLS COUNTED BLD: 100
UROBILINOGEN UR-MCNC: 4 MG/DL (ref ?–2)
WBC #/AREA URNS HPF: 3 /HPF (ref 0–6)

## 2020-02-27 PROCEDURE — 85660 RBC SICKLE CELL TEST: CPT

## 2020-02-27 PROCEDURE — A6250 SKIN SEAL PROTECT MOISTURIZR: HCPCS

## 2020-02-27 PROCEDURE — 36415 COLL VENOUS BLD VENIPUNCTURE: CPT

## 2020-02-27 PROCEDURE — 85018 HEMOGLOBIN: CPT

## 2020-02-27 PROCEDURE — 85007 BL SMEAR W/DIFF WBC COUNT: CPT

## 2020-02-27 PROCEDURE — 85014 HEMATOCRIT: CPT

## 2020-02-27 PROCEDURE — 85025 COMPLETE CBC W/AUTO DIFF WBC: CPT

## 2020-02-27 PROCEDURE — 86901 BLOOD TYPING SEROLOGIC RH(D): CPT

## 2020-02-27 PROCEDURE — 81001 URINALYSIS AUTO W/SCOPE: CPT

## 2020-02-27 PROCEDURE — 86900 BLOOD TYPING SEROLOGIC ABO: CPT

## 2020-02-27 PROCEDURE — 86850 RBC ANTIBODY SCREEN: CPT

## 2020-02-27 PROCEDURE — 86920 COMPATIBILITY TEST SPIN: CPT

## 2020-02-27 PROCEDURE — 80076 HEPATIC FUNCTION PANEL: CPT

## 2020-02-27 PROCEDURE — 85045 AUTOMATED RETICULOCYTE COUNT: CPT

## 2020-02-27 PROCEDURE — 94760 N-INVAS EAR/PLS OXIMETRY 1: CPT

## 2020-02-27 PROCEDURE — P9016 RBC LEUKOCYTES REDUCED: HCPCS

## 2020-02-27 PROCEDURE — 80048 BASIC METABOLIC PNL TOTAL CA: CPT

## 2020-02-27 PROCEDURE — 87116 MYCOBACTERIA CULTURE: CPT

## 2020-02-27 RX ADMIN — APIXABAN SCH MG: 5 TABLET, FILM COATED ORAL at 21:47

## 2020-02-27 RX ADMIN — DEXTROSE AND SODIUM CHLORIDE SCH MLS/HR: 5; .2 INJECTION, SOLUTION INTRAVENOUS at 22:55

## 2020-02-27 RX ADMIN — ZOLPIDEM TARTRATE PRN MG: 5 TABLET ORAL at 22:46

## 2020-02-27 RX ADMIN — MORPHINE SULFATE SCH MG: 30 TABLET, FILM COATED, EXTENDED RELEASE ORAL at 21:46

## 2020-02-27 RX ADMIN — DEXTROSE AND SODIUM CHLORIDE SCH MLS/HR: 5; .2 INJECTION, SOLUTION INTRAVENOUS at 19:20

## 2020-02-27 RX ADMIN — HYDROMORPHONE HYDROCHLORIDE PRN MG: 2 INJECTION, SOLUTION INTRAMUSCULAR; INTRAVENOUS; SUBCUTANEOUS at 19:39

## 2020-02-27 RX ADMIN — HYDROMORPHONE HYDROCHLORIDE PRN MG: 2 INJECTION, SOLUTION INTRAMUSCULAR; INTRAVENOUS; SUBCUTANEOUS at 22:46

## 2020-02-27 NOTE — HISTORY AND PHYSICAL REPORT
History of Present Illness


Date of examination: 02/27/20


Date of admission: 


02/27/20 11:00





Chief complaint: 


SCD/ acute pain crisis.





History of present illness: 


Patient presented to the ER, with CC of diffuse joint pain, unable to control, 

at home.He was seen by the Er physician, and admitted for Sickle pain crisis.He 

will be hydrated, monitor labs, and pain control.once stabilized, will d/c home.








Past History


Past Medical History: anemia


Social history: no significant social history, single


Family history: no significant family history





Medications and Allergies


                                    Allergies











Allergy/AdvReac Type Severity Reaction Status Date / Time


 


No Known Allergies Allergy   Verified 02/27/20 08:49











                                Home Medications











 Medication  Instructions  Recorded  Confirmed  Last Taken  Type


 


Hydroxyurea [Droxia] 1,500 mg PO DAILY #30 capsule 02/24/18 12/10/19 09/10/19 Rx


 


Folic Acid [Folvite] 1 mg PO DAILY 06/21/18 12/10/19 09/10/19 History


 


Morphine ER [Ms Contin ER] 30 mg PO BID 06/21/18 12/10/19 09/10/19 History


 


Oxycodone HCl/Acetaminophen 1 tab PO Q4-6H PRN #6 tablet 05/05/19 12/10/19 

09/10/19 Rx





[Percocet 10/325 mg]     


 


Apixaban [Eliquis] 2.5 mg PO BID #60 tablet 06/10/19 12/10/19 09/10/19 Rx


 


Zolpidem [Ambien] 10 mg PO QHS PRN 08/12/19 12/10/19 09/10/19 History


 


Albuterol INH(or & Nicu Only) 2 puff IH QID PRN #1 inhalation 01/02/20  Unknown 

Rx





[ProAir HFA Inhaler]     


 


Amoxicillin/Potassium Clav 1 each PO BID #14 tablet 01/02/20  Unknown Rx





[Augmentin 875-125 Tablet]     


 


Benzonatate [Tessalon Perles] 100 mg PO Q8HR #10 capsule 01/02/20  Unknown Rx


 


predniSONE [Deltasone] 20 mg PO QDAY #5 tab 01/02/20  Unknown Rx











Active Meds: 


Active Medications





Diphenhydramine HCl (Benadryl)  12.5 mg IV Q3H PRN


   PRN Reason: Itching


   Last Admin: 02/27/20 19:38 Dose:  12.5 mg


   Documented by: 


Hydromorphone HCl (Dilaudid)  2 mg IM Q3H PRN


   PRN Reason: Pain , Severe (7-10)


   Last Admin: 02/27/20 19:39 Dose:  2 mg


   Documented by: 


Dextrose/Sodium Chloride (D5ns 0.2%)  1,000 mls @ 250 mls/hr IV AS DIRECT DULCE


   Last Admin: 02/27/20 19:20 Dose:  250 mls/hr


   Documented by: 











Review of Systems


Constitutional: weakness, chronic pain


Cardiovascular: shortness of breath


Musculoskeletal: low back pain





Exam





- Constitutional


Vitals: 


                                        











Temp Pulse Resp BP Pulse Ox


 


 98.3 F   80   22   104/52   93 


 


 02/27/20 16:08  02/27/20 16:08  02/27/20 16:08  02/27/20 16:08  02/27/20 16:08











General appearance: Present: mild distress, well-nourished





- EENT


Eyes: Present: PERRL


ENT: hearing intact, clear oral mucosa





- Neck


Neck: Present: supple, normal ROM





- Respiratory


Respiratory effort: normal


Respiratory: bilateral: CTA





- Cardiovascular


Heart Sounds: Present: S1 & S2.  Absent: rub, click





- Extremities


Extremities: pulses symmetrical, No edema


Peripheral Pulses: within normal limits





- Abdominal


General gastrointestinal: Present: soft, non-tender, non-distended, normal bowel

 sounds


Male genitourinary: Present: deferred





- Rectal


Rectal Exam: deferred





- Integumentary


Integumentary: Present: clear, warm, dry





- Musculoskeletal


Musculoskeletal: gait normal, strength equal bilaterally





- Psychiatric


Psychiatric: appropriate mood/affect, intact judgment & insight





- Neurologic


Neurologic: CNII-XII intact, moves all extremities





Results





- Labs


CBC & Chem 7: 


                                 02/27/20 09:30





                                 02/27/20 09:30


Labs: 


                              Abnormal lab results











  02/27/20 02/27/20 Range/Units





  09:30 09:30 


 


RBC  2.22 L   (3.65-5.03)  M/mm3


 


Hgb  7.7 L   (11.8-15.2)  gm/dl


 


Hct  21.3 L   (35.5-45.6)  %


 


MCV  96 H   (84-94)  fl


 


MCH  35 H   (28-32)  pg


 


MCHC  36 H   (32-34)  %


 


RDW  26.3 H   (13.2-15.2)  %


 


Monocytes % (Manual)  10.0 H   (0.0-7.3)  %


 


Nucleated RBC %  3.0 H   (0.0-0.9)  %


 


Monocytes # (Manual)  0.9 H   (0.0-0.8)  K/mm3


 


Percent Retic  18.43 H   (0.78-2.58)  %


 


Carbon Dioxide   17 L  (22-30)  mmol/L


 


BUN   5 L  (9-20)  mg/dL


 


Creatinine   0.4 L  (0.8-1.5)  mg/dL


 


Glucose   140 H  ()  mg/dL


 


Total Bilirubin   8.70 H  (0.1-1.2)  mg/dL


 


Direct Bilirubin   2.1 H  (0-0.2)  mg/dL


 


AST   52 H  (5-40)  units/L


 


Alkaline Phosphatase   171 H  ()  units/L














Assessment and Plan





- Patient Problems


(1) Acute sickle cell crisis


Current Visit: Yes   Status: Acute   


Plan to address problem: 


Pain control, supportive.








(2) Dehydration


Current Visit: Yes   Status: Acute   


Plan to address problem: 


hydrate.








(3) Sickle cell anemia


Current Visit: Yes   Status: Acute   


Plan to address problem: 


monitor, and correct.

## 2020-02-27 NOTE — EMERGENCY DEPARTMENT REPORT
ED General Adult HPI





- General


Chief complaint: Sickle Cell Crisis


Stated complaint: SICKLE PAIN, BACK PAIN


Time Seen by Provider: 02/27/20 09:06


Source: patient


Mode of arrival: Ambulatory


Limitations: No Limitations





- History of Present Illness


Initial comments: 





Patient is 30 years old male with history of sickle cell disease.  Patient 

presented to the ER complaining of a sickle cell crisis that started this 

morning.  Patient stated that he is having pain in the lower back and lower leg 

on both sides.  Patient denied any recent history of injury.  No fever or 

chills.  No nausea or vomiting.  Patient also denied any chest pain or shortness

of breath.





- Related Data


                                Home Medications











 Medication  Instructions  Recorded  Confirmed  Last Taken


 


Folic Acid [Folvite] 1 mg PO DAILY 06/21/18 12/10/19 09/10/19


 


Morphine ER [Ms Contin ER] 30 mg PO BID 06/21/18 12/10/19 09/10/19


 


Zolpidem [Ambien] 10 mg PO QHS PRN 08/12/19 12/10/19 09/10/19








                                  Previous Rx's











 Medication  Instructions  Recorded  Last Taken  Type


 


Hydroxyurea [Droxia] 1,500 mg PO DAILY #30 capsule 02/24/18 09/10/19 Rx


 


Oxycodone HCl/Acetaminophen 1 tab PO Q4-6H PRN #6 tablet 05/05/19 09/10/19 Rx





[Percocet 10/325 mg]    


 


Apixaban [Eliquis] 2.5 mg PO BID #60 tablet 06/10/19 09/10/19 Rx


 


Albuterol INH(or & Nicu Only) 2 puff IH QID PRN #1 inhalation 01/02/20 Unknown 

Rx





[ProAir HFA Inhaler]    


 


Amoxicillin/Potassium Clav 1 each PO BID #14 tablet 01/02/20 Unknown Rx





[Augmentin 875-125 Tablet]    


 


Benzonatate [Tessalon Perles] 100 mg PO Q8HR #10 capsule 01/02/20 Unknown Rx


 


predniSONE [Deltasone] 20 mg PO QDAY #5 tab 01/02/20 Unknown Rx











                                    Allergies











Allergy/AdvReac Type Severity Reaction Status Date / Time


 


No Known Allergies Allergy   Verified 02/27/20 08:49














ED Review of Systems


ROS: 


Stated complaint: SICKLE PAIN, BACK PAIN


Other details as noted in HPI





Comment: All other systems reviewed and negative


Constitutional: denies: chills, fever


Respiratory: denies: cough, shortness of breath, SOB with exertion


Cardiovascular: denies: chest pain, palpitations


Gastrointestinal: denies: abdominal pain, nausea, vomiting, diarrhea, 

constipation, hematemesis, melena, hematochezia


Musculoskeletal: back pain, arthralgia, myalgia.  denies: joint swelling


Neurological: denies: headache, weakness, numbness, paresthesias, confusion, 

abnormal gait





ED Past Medical Hx





- Past Medical History


Hx Hypertension: No


Hx CVA: No


Hx Heart Attack/AMI: No


Hx Congestive Heart Failure: No


Hx Diabetes: No


Hx Deep Vein Thrombosis: No


Hx Pulmonary Embolism: No


Hx GERD: No


Hx Liver Disease: No


Hx Renal Disease: No


Hx Sickle Cell Disease: Yes


Hx Arthritis: No


Hx Headaches / Migraines: No


Hx Seizures: No


Hx Kidney Stones: No


Hx Psychiatric Treatment: No


Hx Asthma: Yes (denies hx exacerbations)


Hx COPD: No


Hx Tuberculosis: No


Hx Dementia: No


Hx HIV: No


Additional medical history: Acute chest syndrome,





- Surgical History


Hx Coronary Stent: No


Hx Open Heart Surgery: No


Hx Pacemaker: No


Hx Internal Defibrillator: No


Hx Cholecystectomy: Yes


Hx Appendectomy: No


Hx Breast Surgery: No


Additional Surgical History: TRACHEOSTOMY.  2 PORTS AND REMOVAL.  LIVER BIOPSY 

X2





- Social History


Smoking Status: Never Smoker


Substance Use Type: None





- Medications


Home Medications: 


                                Home Medications











 Medication  Instructions  Recorded  Confirmed  Last Taken  Type


 


Hydroxyurea [Droxia] 1,500 mg PO DAILY #30 capsule 02/24/18 12/10/19 09/10/19 Rx


 


Folic Acid [Folvite] 1 mg PO DAILY 06/21/18 12/10/19 09/10/19 History


 


Morphine ER [Ms Contin ER] 30 mg PO BID 06/21/18 12/10/19 09/10/19 History


 


Oxycodone HCl/Acetaminophen 1 tab PO Q4-6H PRN #6 tablet 05/05/19 12/10/19 09/

10/19 Rx





[Percocet 10/325 mg]     


 


Apixaban [Eliquis] 2.5 mg PO BID #60 tablet 06/10/19 12/10/19 09/10/19 Rx


 


Zolpidem [Ambien] 10 mg PO QHS PRN 08/12/19 12/10/19 09/10/19 History


 


Albuterol INH(or & Nicu Only) 2 puff IH QID PRN #1 inhalation 01/02/20  Unknown 

Rx





[ProAir HFA Inhaler]     


 


Amoxicillin/Potassium Clav 1 each PO BID #14 tablet 01/02/20  Unknown Rx





[Augmentin 875-125 Tablet]     


 


Benzonatate [Tessalon Perles] 100 mg PO Q8HR #10 capsule 01/02/20  Unknown Rx


 


predniSONE [Deltasone] 20 mg PO QDAY #5 tab 01/02/20  Unknown Rx














ED Physical Exam





- General


Limitations: No Limitations


General appearance: alert, in no apparent distress





- Head


Head exam: Present: atraumatic, normocephalic, normal inspection





- Eye


Eye exam: Present: normal appearance





- ENT


ENT exam: Present: normal exam, normal orophraynx, mucous membranes moist





- Neck


Neck exam: Present: normal inspection, full ROM.  Absent: tenderness, 

meningismus, lymphadenopathy, thyromegaly





- Respiratory


Respiratory exam: Present: normal lung sounds bilaterally





- Cardiovascular


Cardiovascular Exam: Present: regular rate, normal rhythm, normal heart sounds





- GI/Abdominal


GI/Abdominal exam: Present: soft, normal bowel sounds.  Absent: distended, tend

erness, guarding, rebound, rigid, organomegaly, mass, bruit, pulsatile mass, 

hernia





- Extremities Exam


Extremities exam: Present: normal inspection, full ROM, normal capillary refill.

 Absent: pedal edema, calf tenderness





- Back Exam


Back exam: Present: normal inspection, full ROM.  Absent: CVA tenderness (R), 

CVA tenderness (L)





- Neurological Exam


Neurological exam: Present: alert, oriented X3, CN II-XII intact, normal gait, 

reflexes normal





- Psychiatric


Psychiatric exam: Present: normal mood





- Skin


Skin exam: Present: warm, intact, normal color





ED Course


                                   Vital Signs











  02/27/20 02/27/20 02/27/20





  08:52 09:34 09:45


 


Temperature 98.4 F  


 


Pulse Rate 90  90


 


Respiratory 22  29 H





Rate   


 


Blood Pressure 119/67  109/73


 


O2 Sat by Pulse 94 96 94





Oximetry   














  02/27/20 02/27/20 02/27/20





  09:49 10:00 10:30


 


Temperature   


 


Pulse Rate  79 77


 


Respiratory 29 H 26 H 20





Rate   


 


Blood Pressure  97/60 113/66


 


O2 Sat by Pulse 94 94 97





Oximetry   














ED Medical Decision Making





- Lab Data


Result diagrams: 


                                 02/27/20 09:30 02/27/20 09:30





- Medical Decision Making





Patient is 30 years old male with history of sickle cell disease.  Patient prese

nted to the ER complaining of a sickle cell crisis that started this morning.  

Patient stated that he is having pain in the lower back and lower leg on both 

sides.  Patient denied any recent history of injury.  No fever or chills.  No 

nausea or vomiting.  Patient also denied any chest pain or shortness of breath.





Patient received normal saline, morphine, Toradol and Zofran.  Labs reviewed and

showed chronic changes with a reticulocyte count of 18.  I discussed the patient

with Dr. Ca, he advised to admit the patient to his service for further 

management.


Critical care attestation.: 


If time is entered above; I have spent that time in minutes in the direct care 

of this critically ill patient, excluding procedure time.








ED Disposition


Clinical Impression: 


 Acute sickle cell crisis





Disposition: DC-09 OP ADMIT IP TO THIS HOSP


Is pt being admited?: Yes


Condition: Stable


Referrals: 


PRIMARY CARE,MD [Primary Care Provider] - 3-5 Days

## 2020-02-28 RX ADMIN — HYDROMORPHONE HYDROCHLORIDE PRN MG: 2 INJECTION, SOLUTION INTRAMUSCULAR; INTRAVENOUS; SUBCUTANEOUS at 01:45

## 2020-02-28 RX ADMIN — DEXTROSE AND SODIUM CHLORIDE SCH MLS/HR: 5; .2 INJECTION, SOLUTION INTRAVENOUS at 14:51

## 2020-02-28 RX ADMIN — HYDROMORPHONE HYDROCHLORIDE PRN MG: 2 INJECTION, SOLUTION INTRAMUSCULAR; INTRAVENOUS; SUBCUTANEOUS at 21:35

## 2020-02-28 RX ADMIN — HYDROMORPHONE HYDROCHLORIDE PRN MG: 2 INJECTION, SOLUTION INTRAMUSCULAR; INTRAVENOUS; SUBCUTANEOUS at 18:12

## 2020-02-28 RX ADMIN — DEXTROSE AND SODIUM CHLORIDE SCH MLS/HR: 5; .2 INJECTION, SOLUTION INTRAVENOUS at 11:24

## 2020-02-28 RX ADMIN — HYDROMORPHONE HYDROCHLORIDE PRN MG: 2 INJECTION, SOLUTION INTRAMUSCULAR; INTRAVENOUS; SUBCUTANEOUS at 04:46

## 2020-02-28 RX ADMIN — HYDROMORPHONE HYDROCHLORIDE PRN MG: 2 INJECTION, SOLUTION INTRAMUSCULAR; INTRAVENOUS; SUBCUTANEOUS at 08:17

## 2020-02-28 RX ADMIN — DEXTROSE AND SODIUM CHLORIDE SCH MLS/HR: 5; .2 INJECTION, SOLUTION INTRAVENOUS at 07:29

## 2020-02-28 RX ADMIN — HYDROMORPHONE HYDROCHLORIDE PRN MG: 2 INJECTION, SOLUTION INTRAMUSCULAR; INTRAVENOUS; SUBCUTANEOUS at 14:50

## 2020-02-28 RX ADMIN — APIXABAN SCH MG: 5 TABLET, FILM COATED ORAL at 21:40

## 2020-02-28 RX ADMIN — HYDROXYUREA SCH MG: 500 CAPSULE ORAL at 11:04

## 2020-02-28 RX ADMIN — MORPHINE SULFATE SCH MG: 30 TABLET, FILM COATED, EXTENDED RELEASE ORAL at 11:04

## 2020-02-28 RX ADMIN — MORPHINE SULFATE SCH MG: 30 TABLET, FILM COATED, EXTENDED RELEASE ORAL at 21:36

## 2020-02-28 RX ADMIN — FOLIC ACID SCH MG: 1 TABLET ORAL at 11:04

## 2020-02-28 RX ADMIN — HYDROMORPHONE HYDROCHLORIDE PRN MG: 2 INJECTION, SOLUTION INTRAMUSCULAR; INTRAVENOUS; SUBCUTANEOUS at 11:23

## 2020-02-28 RX ADMIN — APIXABAN SCH MG: 5 TABLET, FILM COATED ORAL at 11:03

## 2020-02-28 RX ADMIN — DEXTROSE AND SODIUM CHLORIDE SCH MLS/HR: 5; .2 INJECTION, SOLUTION INTRAVENOUS at 18:21

## 2020-02-28 NOTE — PROGRESS NOTE
Assessment and Plan





- Patient Problems


(1) Acute sickle cell crisis


Current Visit: Yes   Status: Acute   


Plan to address problem: 


Pain control, supportive.








(2) Dehydration


Current Visit: Yes   Status: Acute   


Plan to address problem: 


hydrate.








(3) Sickle cell anemia


Current Visit: Yes   Status: Acute   


Plan to address problem: 


monitor, and correct.








Subjective


Date of service: 02/28/20


Principal diagnosis: SCD/Dehydration. Anemia.


Interval history: 


Patient seen/examined, resting in bed, records reviewed, case d/w patient.Pain 

rated as 7/10. will continue with current management.








Objective





- Constitutional


Vitals: 


                               Vital Signs - 12hr











  02/28/20 02/28/20 02/28/20





  11:32 17:00 19:20


 


Temperature 97.6 F 98.3 F 


 


Pulse Rate 74 82 


 


Respiratory 22 22 





Rate   


 


Blood Pressure 105/69 125/78 


 


O2 Sat by Pulse 100 100 97





Oximetry   











General appearance: Present: mild distress, well-nourished





- EENT


Eyes: PERRL, EOM intact


ENT: hearing intact, clear oral mucosa


Ears: bilateral: normal





- Neck


Neck: supple, normal ROM





- Respiratory


Respiratory effort: normal


Respiratory: bilateral: CTA





- Breasts


Breasts: deferred





- Cardiovascular


Rhythm: regular


Heart Sounds: Present: S1 & S2.  Absent: gallop, rub


Extremities: pulses intact, No edema, normal color, Full ROM





- Gastrointestinal


General gastrointestinal: Present: soft, non-tender, non-distended, normal bowel

sounds


Rectal Exam: deferred





- Genitourinary


Male genitourinary: deferred





- Integumentary


Integumentary: clear, warm, dry





- Musculoskeletal


Musculoskeletal: 1, strength equal bilaterally





- Neurologic


Neurologic: moves all extremities





- Psychiatric


Psychiatric: memory intact, appropriate mood/affect, intact judgment & insight





- Labs


CBC & Chem 7: 


                                 02/27/20 09:30





                                 02/27/20 09:30

## 2020-02-29 RX ADMIN — APIXABAN SCH MG: 5 TABLET, FILM COATED ORAL at 09:27

## 2020-02-29 RX ADMIN — HYDROMORPHONE HYDROCHLORIDE PRN MG: 2 INJECTION, SOLUTION INTRAMUSCULAR; INTRAVENOUS; SUBCUTANEOUS at 00:40

## 2020-02-29 RX ADMIN — HYDROMORPHONE HYDROCHLORIDE PRN MG: 2 INJECTION, SOLUTION INTRAMUSCULAR; INTRAVENOUS; SUBCUTANEOUS at 16:46

## 2020-02-29 RX ADMIN — HYDROMORPHONE HYDROCHLORIDE PRN MG: 2 INJECTION, SOLUTION INTRAMUSCULAR; INTRAVENOUS; SUBCUTANEOUS at 03:08

## 2020-02-29 RX ADMIN — HYDROMORPHONE HYDROCHLORIDE PRN MG: 2 INJECTION, SOLUTION INTRAMUSCULAR; INTRAVENOUS; SUBCUTANEOUS at 09:37

## 2020-02-29 RX ADMIN — APIXABAN SCH MG: 5 TABLET, FILM COATED ORAL at 21:07

## 2020-02-29 RX ADMIN — HYDROMORPHONE HYDROCHLORIDE PRN MG: 2 INJECTION, SOLUTION INTRAMUSCULAR; INTRAVENOUS; SUBCUTANEOUS at 06:45

## 2020-02-29 RX ADMIN — ZOLPIDEM TARTRATE PRN MG: 5 TABLET ORAL at 21:03

## 2020-02-29 RX ADMIN — MORPHINE SULFATE SCH MG: 30 TABLET, FILM COATED, EXTENDED RELEASE ORAL at 21:03

## 2020-02-29 RX ADMIN — DEXTROSE AND SODIUM CHLORIDE SCH MLS/HR: 5; .2 INJECTION, SOLUTION INTRAVENOUS at 03:08

## 2020-02-29 RX ADMIN — DEXTROSE AND SODIUM CHLORIDE SCH MLS/HR: 5; .2 INJECTION, SOLUTION INTRAVENOUS at 12:56

## 2020-02-29 RX ADMIN — DEXTROSE AND SODIUM CHLORIDE SCH MLS/HR: 5; .2 INJECTION, SOLUTION INTRAVENOUS at 00:43

## 2020-02-29 RX ADMIN — HYDROXYUREA SCH MG: 500 CAPSULE ORAL at 09:27

## 2020-02-29 RX ADMIN — HYDROMORPHONE HYDROCHLORIDE PRN MG: 2 INJECTION, SOLUTION INTRAMUSCULAR; INTRAVENOUS; SUBCUTANEOUS at 23:07

## 2020-02-29 RX ADMIN — DEXTROSE AND SODIUM CHLORIDE SCH MLS/HR: 5; .2 INJECTION, SOLUTION INTRAVENOUS at 21:01

## 2020-02-29 RX ADMIN — MORPHINE SULFATE SCH MG: 30 TABLET, FILM COATED, EXTENDED RELEASE ORAL at 09:28

## 2020-02-29 RX ADMIN — HYDROMORPHONE HYDROCHLORIDE PRN MG: 2 INJECTION, SOLUTION INTRAMUSCULAR; INTRAVENOUS; SUBCUTANEOUS at 13:03

## 2020-02-29 RX ADMIN — HYDROMORPHONE HYDROCHLORIDE PRN MG: 2 INJECTION, SOLUTION INTRAMUSCULAR; INTRAVENOUS; SUBCUTANEOUS at 19:40

## 2020-02-29 RX ADMIN — FOLIC ACID SCH MG: 1 TABLET ORAL at 09:27

## 2020-02-29 RX ADMIN — DEXTROSE AND SODIUM CHLORIDE SCH MLS/HR: 5; .2 INJECTION, SOLUTION INTRAVENOUS at 16:47

## 2020-02-29 NOTE — PROGRESS NOTE
Assessment and Plan





- Patient Problems


(1) Acute sickle cell crisis


Current Visit: Yes   Status: Acute   


Plan to address problem: 


Pain control, supportive.








(2) Dehydration


Current Visit: Yes   Status: Acute   


Plan to address problem: 


hydrate.








(3) Sickle cell anemia


Current Visit: Yes   Status: Acute   


Plan to address problem: 


monitor, and correct.








Subjective


Date of service: 02/29/20


Principal diagnosis: SCD/Dehydration. Anemia.


Interval history: 


Patient seen/examined, resting in bed, records reviewed, case d/w patient.Pain 

rated as 7/10. will continue with current management.


Patient seen/examined, resting in bed, records reviewed, case d/w patient.Pain 

rated at 6-7/10.





Objective





- Constitutional


Vitals: 


                               Vital Signs - 12hr











  02/29/20 02/29/20 02/29/20





  10:00 12:26 16:53


 


Temperature  97.9 F 98.5 F


 


Pulse Rate  76 82


 


Pulse Rate [   





Apical]   


 


Respiratory  18 18





Rate   


 


Blood Pressure  104/58 104/60


 


O2 Sat by Pulse 97 98 98





Oximetry   














  02/29/20 02/29/20 02/29/20





  19:40 19:44 20:10


 


Temperature   


 


Pulse Rate   


 


Pulse Rate [  86 





Apical]   


 


Respiratory 18 18 18





Rate   


 


Blood Pressure   


 


O2 Sat by Pulse  98 





Oximetry   














  02/29/20 02/29/20 02/29/20





  20:36 21:03 21:04


 


Temperature   98.2 F


 


Pulse Rate   77


 


Pulse Rate [   





Apical]   


 


Respiratory  18 20





Rate   


 


Blood Pressure   118/61


 


O2 Sat by Pulse 96  96





Oximetry   











General appearance: Present: mild distress, well-nourished





- EENT


Eyes: PERRL, EOM intact


ENT: hearing intact, clear oral mucosa


Ears: bilateral: normal





- Neck


Neck: supple, normal ROM





- Respiratory


Respiratory effort: normal


Respiratory: bilateral: CTA





- Breasts


Breasts: deferred





- Cardiovascular


Rhythm: regular


Heart Sounds: Present: S1 & S2.  Absent: gallop, rub


Extremities: pulses intact, No edema, normal color, Full ROM





- Gastrointestinal


General gastrointestinal: Present: soft, non-tender, non-distended, normal bowel

sounds


Rectal Exam: deferred





- Genitourinary


Male genitourinary: deferred





- Integumentary


Integumentary: clear, warm, dry





- Musculoskeletal


Musculoskeletal: 1, strength equal bilaterally





- Neurologic


Neurologic: moves all extremities





- Psychiatric


Psychiatric: memory intact, appropriate mood/affect, intact judgment & insight





- Labs


CBC & Chem 7: 


                                 02/27/20 09:30





                                 02/27/20 09:30

## 2020-03-01 LAB
HCT VFR BLD CALC: 18.1 % (ref 35.5–45.6)
HGB BLD-MCNC: 6.5 GM/DL (ref 11.8–15.2)

## 2020-03-01 PROCEDURE — 30233N1 TRANSFUSION OF NONAUTOLOGOUS RED BLOOD CELLS INTO PERIPHERAL VEIN, PERCUTANEOUS APPROACH: ICD-10-PCS | Performed by: INTERNAL MEDICINE

## 2020-03-01 RX ADMIN — HYDROMORPHONE HYDROCHLORIDE PRN MG: 2 INJECTION, SOLUTION INTRAMUSCULAR; INTRAVENOUS; SUBCUTANEOUS at 19:02

## 2020-03-01 RX ADMIN — DEXTROSE AND SODIUM CHLORIDE SCH MLS/HR: 5; .2 INJECTION, SOLUTION INTRAVENOUS at 19:08

## 2020-03-01 RX ADMIN — HYDROMORPHONE HYDROCHLORIDE PRN MG: 2 INJECTION, SOLUTION INTRAMUSCULAR; INTRAVENOUS; SUBCUTANEOUS at 21:59

## 2020-03-01 RX ADMIN — HYDROMORPHONE HYDROCHLORIDE PRN MG: 2 INJECTION, SOLUTION INTRAMUSCULAR; INTRAVENOUS; SUBCUTANEOUS at 12:54

## 2020-03-01 RX ADMIN — HYDROMORPHONE HYDROCHLORIDE PRN MG: 2 INJECTION, SOLUTION INTRAMUSCULAR; INTRAVENOUS; SUBCUTANEOUS at 06:34

## 2020-03-01 RX ADMIN — DEXTROSE AND SODIUM CHLORIDE SCH MLS/HR: 5; .2 INJECTION, SOLUTION INTRAVENOUS at 07:12

## 2020-03-01 RX ADMIN — HYDROMORPHONE HYDROCHLORIDE PRN MG: 2 INJECTION, SOLUTION INTRAMUSCULAR; INTRAVENOUS; SUBCUTANEOUS at 03:06

## 2020-03-01 RX ADMIN — APIXABAN SCH MG: 5 TABLET, FILM COATED ORAL at 09:03

## 2020-03-01 RX ADMIN — MORPHINE SULFATE SCH MG: 30 TABLET, FILM COATED, EXTENDED RELEASE ORAL at 21:55

## 2020-03-01 RX ADMIN — MORPHINE SULFATE SCH MG: 30 TABLET, FILM COATED, EXTENDED RELEASE ORAL at 09:03

## 2020-03-01 RX ADMIN — HYDROMORPHONE HYDROCHLORIDE PRN MG: 2 INJECTION, SOLUTION INTRAMUSCULAR; INTRAVENOUS; SUBCUTANEOUS at 16:03

## 2020-03-01 RX ADMIN — DEXTROSE AND SODIUM CHLORIDE SCH MLS/HR: 5; .2 INJECTION, SOLUTION INTRAVENOUS at 16:02

## 2020-03-01 RX ADMIN — HYDROXYUREA SCH MG: 500 CAPSULE ORAL at 09:04

## 2020-03-01 RX ADMIN — DEXTROSE AND SODIUM CHLORIDE SCH MLS/HR: 5; .2 INJECTION, SOLUTION INTRAVENOUS at 03:05

## 2020-03-01 RX ADMIN — FOLIC ACID SCH MG: 1 TABLET ORAL at 09:04

## 2020-03-01 RX ADMIN — HYDROMORPHONE HYDROCHLORIDE PRN MG: 2 INJECTION, SOLUTION INTRAMUSCULAR; INTRAVENOUS; SUBCUTANEOUS at 09:32

## 2020-03-01 RX ADMIN — APIXABAN SCH MG: 5 TABLET, FILM COATED ORAL at 21:55

## 2020-03-01 NOTE — PROGRESS NOTE
Assessment and Plan





- Patient Problems


(1) Acute sickle cell crisis


Current Visit: Yes   Status: Acute   


Plan to address problem: 


Pain control, supportive.








(2) Dehydration


Current Visit: Yes   Status: Acute   


Plan to address problem: 


hydrate.








(3) Sickle cell anemia


Current Visit: Yes   Status: Acute   


Plan to address problem: 


monitor, and correct.








Subjective


Date of service: 03/01/20


Principal diagnosis: SCD/Dehydration. Anemia.


Interval history: 


Patient seen/examined, resting in bed, records reviewed, case d/w patient.Pain 

rated as 7/10. will continue with current management.


Patient seen/examined, resting in bed, records reviewed, case d/w patient.Pain 

rated at 6-7/10.


Patient seen/examined, resting in bed, no new issues at this time. He s/p 1unit 

of PRBC, and awaiting the second unit.





Objective





- Constitutional


Vitals: 


                               Vital Signs - 12hr











  03/01/20 03/01/20 03/01/20





  05:29 08:10 10:14


 


Temperature 98.2 F  98.4 F


 


Pulse Rate 82  89


 


Respiratory 16  20





Rate   


 


Blood Pressure 107/63  123/66


 


O2 Sat by Pulse 98 98 95





Oximetry   














  03/01/20 03/01/20 03/01/20





  10:30 10:45 11:15


 


Temperature 98.4 F 98.1 F 98.2 F


 


Pulse Rate 87 88 85


 


Respiratory 20 20 20





Rate   


 


Blood Pressure 123/66 103/55 109/61


 


O2 Sat by Pulse 94 95 97





Oximetry   














  03/01/20 03/01/20 03/01/20





  11:17 11:45 12:15


 


Temperature 98.2 F 98.2 F 98.2 F


 


Pulse Rate 82 89 96 H


 


Respiratory 22 20 20





Rate   


 


Blood Pressure 108/60 110/61 117/69


 


O2 Sat by Pulse 95 96 97





Oximetry   














  03/01/20 03/01/20





  12:45 13:15


 


Temperature 98.4 F 98.6 F


 


Pulse Rate 95 H 89


 


Respiratory 20 20





Rate  


 


Blood Pressure 119/62 114/63


 


O2 Sat by Pulse 97 96





Oximetry  











General appearance: Present: mild distress, well-nourished





- EENT


Eyes: PERRL, EOM intact


ENT: hearing intact, clear oral mucosa


Ears: bilateral: normal





- Neck


Neck: supple, normal ROM





- Respiratory


Respiratory effort: normal


Respiratory: bilateral: CTA





- Breasts


Breasts: deferred





- Cardiovascular


Rhythm: regular


Heart Sounds: Present: S1 & S2.  Absent: gallop, rub


Extremities: pulses intact, No edema, normal color, Full ROM





- Gastrointestinal


General gastrointestinal: Present: soft, non-tender, non-distended, normal bowel

sounds


Rectal Exam: deferred





- Genitourinary


Male genitourinary: deferred





- Integumentary


Integumentary: clear, warm, dry





- Musculoskeletal


Musculoskeletal: 1, strength equal bilaterally





- Neurologic


Neurologic: moves all extremities





- Psychiatric


Psychiatric: memory intact, appropriate mood/affect, intact judgment & insight





- Labs


CBC & Chem 7: 


                                 03/01/20 04:50





                                 02/27/20 09:30


Labs: 


                              Abnormal lab results











  03/01/20 03/01/20 Range/Units





  04:50 06:27 


 


Hgb  6.5 L   (11.8-15.2)  gm/dl


 


Hct  18.1 L*   (35.5-45.6)  %


 


Percent Retic  9.65 H   (0.78-2.58)  %


 


Crossmatch   See Detail

## 2020-03-02 RX ADMIN — DEXTROSE AND SODIUM CHLORIDE SCH MLS/HR: 5; .2 INJECTION, SOLUTION INTRAVENOUS at 19:42

## 2020-03-02 RX ADMIN — HYDROMORPHONE HYDROCHLORIDE PRN MG: 2 INJECTION, SOLUTION INTRAMUSCULAR; INTRAVENOUS; SUBCUTANEOUS at 23:50

## 2020-03-02 RX ADMIN — APIXABAN SCH MG: 5 TABLET, FILM COATED ORAL at 22:08

## 2020-03-02 RX ADMIN — HYDROMORPHONE HYDROCHLORIDE PRN MG: 2 INJECTION, SOLUTION INTRAMUSCULAR; INTRAVENOUS; SUBCUTANEOUS at 05:36

## 2020-03-02 RX ADMIN — DEXTROSE AND SODIUM CHLORIDE SCH MLS/HR: 5; .2 INJECTION, SOLUTION INTRAVENOUS at 00:30

## 2020-03-02 RX ADMIN — DEXTROSE AND SODIUM CHLORIDE SCH MLS/HR: 5; .2 INJECTION, SOLUTION INTRAVENOUS at 04:36

## 2020-03-02 RX ADMIN — HYDROMORPHONE HYDROCHLORIDE PRN MG: 2 INJECTION, SOLUTION INTRAMUSCULAR; INTRAVENOUS; SUBCUTANEOUS at 15:43

## 2020-03-02 RX ADMIN — MORPHINE SULFATE SCH MG: 30 TABLET, FILM COATED, EXTENDED RELEASE ORAL at 22:08

## 2020-03-02 RX ADMIN — HYDROMORPHONE HYDROCHLORIDE PRN MG: 2 INJECTION, SOLUTION INTRAMUSCULAR; INTRAVENOUS; SUBCUTANEOUS at 02:00

## 2020-03-02 RX ADMIN — DEXTROSE AND SODIUM CHLORIDE SCH MLS/HR: 5; .2 INJECTION, SOLUTION INTRAVENOUS at 08:57

## 2020-03-02 RX ADMIN — DEXTROSE AND SODIUM CHLORIDE SCH MLS/HR: 5; .2 INJECTION, SOLUTION INTRAVENOUS at 23:49

## 2020-03-02 RX ADMIN — DEXTROSE AND SODIUM CHLORIDE SCH MLS/HR: 5; .2 INJECTION, SOLUTION INTRAVENOUS at 12:23

## 2020-03-02 RX ADMIN — HYDROMORPHONE HYDROCHLORIDE PRN MG: 2 INJECTION, SOLUTION INTRAMUSCULAR; INTRAVENOUS; SUBCUTANEOUS at 08:58

## 2020-03-02 RX ADMIN — FOLIC ACID SCH MG: 1 TABLET ORAL at 11:24

## 2020-03-02 RX ADMIN — HYDROMORPHONE HYDROCHLORIDE PRN MG: 2 INJECTION, SOLUTION INTRAMUSCULAR; INTRAVENOUS; SUBCUTANEOUS at 19:44

## 2020-03-02 RX ADMIN — HYDROMORPHONE HYDROCHLORIDE PRN MG: 2 INJECTION, SOLUTION INTRAMUSCULAR; INTRAVENOUS; SUBCUTANEOUS at 12:09

## 2020-03-02 RX ADMIN — MORPHINE SULFATE SCH MG: 30 TABLET, FILM COATED, EXTENDED RELEASE ORAL at 11:24

## 2020-03-02 RX ADMIN — APIXABAN SCH MG: 5 TABLET, FILM COATED ORAL at 11:24

## 2020-03-02 RX ADMIN — HYDROXYUREA SCH MG: 500 CAPSULE ORAL at 11:25

## 2020-03-02 NOTE — DISCHARGE SUMMARY
Providers





- Providers


Date of Admission: 


02/27/20 11:00





Date of discharge: 03/03/20 (D/c home after noon.)


Attending physician: 


DARLENE KIM





Primary care physician: 


DARLENE KIM








Hospitalization


Reason for admission: SCD/acute pain cisis, anemia


Condition: Stable


Hospital course: 


Patient presented to the ER on the day of admission, with CC  as above, 

admitted, for sxs management, and control. He was treated, with hydration, pain 

control, and blood transfusion.Patient is seen/examined, at this HR, pain level 

6/10, no other issues at this time. Will treat for one more day , and D/C home 

tomorrow.





Disposition: DC-01 TO HOME OR SELFCARE





- Discharge Diagnoses


(1) Acute sickle cell crisis


Status: Acute   





(2) Dehydration


Status: Acute   





(3) Sickle cell anemia


Status: Chronic   





Core Measure Documentation





- Palliative Care


Palliative Care/ Comfort Measures: Not Applicable





- Core Measures


Any of the following diagnoses?: history only





Exam





- Constitutional


Vitals: 


                                        











Temp Pulse Resp BP Pulse Ox


 


 97.6 F   79   18   109/53   96 


 


 03/02/20 12:30  03/02/20 12:30  03/02/20 12:30  03/02/20 12:30  03/02/20 04:38











General appearance: Present: mild distress, well-nourished





- EENT


Eyes: Present: PERRL


ENT: hearing intact, clear oral mucosa





- Neck


Neck: Present: supple, normal ROM





- Respiratory


Respiratory effort: normal


Respiratory: bilateral: CTA





- Cardiovascular


Heart Sounds: Present: S1 & S2.  Absent: rub, click





- Extremities


Extremities: pulses symmetrical, No edema


Peripheral Pulses: within normal limits





- Abdominal


General gastrointestinal: Present: soft, non-tender, non-distended, normal bowel

sounds


Male genitourinary: Present: deferred





- Rectal


Rectal Exam: deferred





- Integumentary


Integumentary: Present: clear, warm, dry





- Musculoskeletal


Musculoskeletal: gait normal, strength equal bilaterally





- Psychiatric


Psychiatric: appropriate mood/affect, intact judgment & insight





- Neurologic


Neurologic: CNII-XII intact, moves all extremities





Plan


Activity: no restrictions


Diet: regular


Follow up with: 


PRIMARY CARE,MD [Referring] - 3-5 Days

## 2020-03-03 VITALS — DIASTOLIC BLOOD PRESSURE: 62 MMHG | SYSTOLIC BLOOD PRESSURE: 106 MMHG

## 2020-03-03 RX ADMIN — MORPHINE SULFATE SCH MG: 30 TABLET, FILM COATED, EXTENDED RELEASE ORAL at 10:42

## 2020-03-03 RX ADMIN — HYDROMORPHONE HYDROCHLORIDE PRN MG: 2 INJECTION, SOLUTION INTRAMUSCULAR; INTRAVENOUS; SUBCUTANEOUS at 12:45

## 2020-03-03 RX ADMIN — DEXTROSE AND SODIUM CHLORIDE SCH MLS/HR: 5; .2 INJECTION, SOLUTION INTRAVENOUS at 04:05

## 2020-03-03 RX ADMIN — FOLIC ACID SCH MG: 1 TABLET ORAL at 10:42

## 2020-03-03 RX ADMIN — HYDROMORPHONE HYDROCHLORIDE PRN MG: 2 INJECTION, SOLUTION INTRAMUSCULAR; INTRAVENOUS; SUBCUTANEOUS at 04:07

## 2020-03-03 RX ADMIN — HYDROXYUREA SCH MG: 500 CAPSULE ORAL at 10:42

## 2020-03-03 RX ADMIN — DEXTROSE AND SODIUM CHLORIDE SCH MLS/HR: 5; .2 INJECTION, SOLUTION INTRAVENOUS at 08:40

## 2020-03-03 RX ADMIN — HYDROMORPHONE HYDROCHLORIDE PRN MG: 2 INJECTION, SOLUTION INTRAMUSCULAR; INTRAVENOUS; SUBCUTANEOUS at 08:37

## 2020-03-03 RX ADMIN — APIXABAN SCH MG: 5 TABLET, FILM COATED ORAL at 10:43

## 2020-06-13 ENCOUNTER — HOSPITAL ENCOUNTER (INPATIENT)
Dept: HOSPITAL 5 - ED | Age: 30
LOS: 4 days | Discharge: HOME | DRG: 812 | End: 2020-06-17
Attending: INTERNAL MEDICINE | Admitting: INTERNAL MEDICINE
Payer: MEDICARE

## 2020-06-13 DIAGNOSIS — R09.02: ICD-10-CM

## 2020-06-13 DIAGNOSIS — Z99.81: ICD-10-CM

## 2020-06-13 DIAGNOSIS — R68.0: ICD-10-CM

## 2020-06-13 DIAGNOSIS — J45.909: ICD-10-CM

## 2020-06-13 DIAGNOSIS — D72.829: ICD-10-CM

## 2020-06-13 DIAGNOSIS — Z90.49: ICD-10-CM

## 2020-06-13 DIAGNOSIS — E86.0: ICD-10-CM

## 2020-06-13 DIAGNOSIS — Z93.0: ICD-10-CM

## 2020-06-13 DIAGNOSIS — D57.00: Primary | ICD-10-CM

## 2020-06-13 DIAGNOSIS — D64.9: ICD-10-CM

## 2020-06-13 PROCEDURE — 71045 X-RAY EXAM CHEST 1 VIEW: CPT

## 2020-06-13 PROCEDURE — 85045 AUTOMATED RETICULOCYTE COUNT: CPT

## 2020-06-13 PROCEDURE — 86920 COMPATIBILITY TEST SPIN: CPT

## 2020-06-13 PROCEDURE — 96361 HYDRATE IV INFUSION ADD-ON: CPT

## 2020-06-13 PROCEDURE — 86900 BLOOD TYPING SEROLOGIC ABO: CPT

## 2020-06-13 PROCEDURE — 96376 TX/PRO/DX INJ SAME DRUG ADON: CPT

## 2020-06-13 PROCEDURE — 85018 HEMOGLOBIN: CPT

## 2020-06-13 PROCEDURE — 96374 THER/PROPH/DIAG INJ IV PUSH: CPT

## 2020-06-13 PROCEDURE — 84484 ASSAY OF TROPONIN QUANT: CPT

## 2020-06-13 PROCEDURE — 36415 COLL VENOUS BLD VENIPUNCTURE: CPT

## 2020-06-13 PROCEDURE — A6250 SKIN SEAL PROTECT MOISTURIZR: HCPCS

## 2020-06-13 PROCEDURE — 85007 BL SMEAR W/DIFF WBC COUNT: CPT

## 2020-06-13 PROCEDURE — 94760 N-INVAS EAR/PLS OXIMETRY 1: CPT

## 2020-06-13 PROCEDURE — 85014 HEMATOCRIT: CPT

## 2020-06-13 PROCEDURE — 80048 BASIC METABOLIC PNL TOTAL CA: CPT

## 2020-06-13 PROCEDURE — 85025 COMPLETE CBC W/AUTO DIFF WBC: CPT

## 2020-06-13 PROCEDURE — 86850 RBC ANTIBODY SCREEN: CPT

## 2020-06-13 PROCEDURE — 93005 ELECTROCARDIOGRAM TRACING: CPT

## 2020-06-13 PROCEDURE — 85660 RBC SICKLE CELL TEST: CPT

## 2020-06-13 PROCEDURE — 96375 TX/PRO/DX INJ NEW DRUG ADDON: CPT

## 2020-06-13 PROCEDURE — P9016 RBC LEUKOCYTES REDUCED: HCPCS

## 2020-06-13 PROCEDURE — 86901 BLOOD TYPING SEROLOGIC RH(D): CPT

## 2020-06-14 LAB
ANISOCYTOSIS BLD QL SMEAR: (no result)
BAND NEUTROPHILS # (MANUAL): 0 K/MM3
BUN SERPL-MCNC: 21 MG/DL (ref 9–20)
BUN/CREAT SERPL: 35 %
CALCIUM SERPL-MCNC: 8.8 MG/DL (ref 8.4–10.2)
HCT VFR BLD CALC: 16.3 % (ref 35.5–45.6)
HEMOLYSIS INDEX: 71
HGB BLD-MCNC: 6.3 GM/DL (ref 11.8–15.2)
HGB S BLD QL SOLY: (no result)
MCHC RBC AUTO-ENTMCNC: 39 % (ref 32–34)
MCV RBC AUTO: 95 FL (ref 84–94)
MYELOCYTES # (MANUAL): 0 K/MM3
PLATELET # BLD: 208 K/MM3 (ref 140–440)
POIKILOCYTOSIS BLD QL SMEAR: (no result)
PROMYELOCYTES # (MANUAL): 0 K/MM3
RBC # BLD AUTO: 1.72 M/MM3 (ref 3.65–5.03)
TARGETS BLD QL SMEAR: (no result)
TOTAL CELLS COUNTED BLD: 100

## 2020-06-14 PROCEDURE — 30233N1 TRANSFUSION OF NONAUTOLOGOUS RED BLOOD CELLS INTO PERIPHERAL VEIN, PERCUTANEOUS APPROACH: ICD-10-PCS | Performed by: INTERNAL MEDICINE

## 2020-06-14 RX ADMIN — MORPHINE SULFATE SCH MG: 30 TABLET, FILM COATED, EXTENDED RELEASE ORAL at 21:14

## 2020-06-14 RX ADMIN — HYDROMORPHONE HYDROCHLORIDE PRN MG: 2 INJECTION, SOLUTION INTRAMUSCULAR; INTRAVENOUS; SUBCUTANEOUS at 12:16

## 2020-06-14 RX ADMIN — HYDROMORPHONE HYDROCHLORIDE PRN MG: 2 INJECTION, SOLUTION INTRAMUSCULAR; INTRAVENOUS; SUBCUTANEOUS at 21:42

## 2020-06-14 RX ADMIN — HYDROMORPHONE HYDROCHLORIDE PRN MG: 2 INJECTION, SOLUTION INTRAMUSCULAR; INTRAVENOUS; SUBCUTANEOUS at 15:18

## 2020-06-14 RX ADMIN — APIXABAN SCH MG: 5 TABLET, FILM COATED ORAL at 21:14

## 2020-06-14 RX ADMIN — DEXTROSE AND SODIUM CHLORIDE SCH MLS/HR: 5; .2 INJECTION, SOLUTION INTRAVENOUS at 19:46

## 2020-06-14 RX ADMIN — HYDROMORPHONE HYDROCHLORIDE PRN MG: 2 INJECTION, SOLUTION INTRAMUSCULAR; INTRAVENOUS; SUBCUTANEOUS at 18:31

## 2020-06-14 RX ADMIN — DEXTROSE AND SODIUM CHLORIDE SCH MLS/HR: 5; .2 INJECTION, SOLUTION INTRAVENOUS at 12:18

## 2020-06-14 NOTE — XRAY REPORT
CHEST 1 VIEW 6/14/2020 12:37 AM



INDICATION / CLINICAL INFORMATION:

chest pain.



COMPARISON: 

Chest x-ray 1/2/2020



FINDINGS:



SUPPORT DEVICES: Right internal jugular Port-A-Cath has tip in SVC.



HEART / MEDIASTINUM: Cardiac silhouette remains enlarged. 



LUNGS / PLEURA: No significant pulmonary or pleural abnormality. No pneumothorax. 



ADDITIONAL FINDINGS: SVC stent again noted.



IMPRESSION:

1. Cardiomegaly without CHF.



Signer Name: Papito Jimenez MD 

Signed: 6/14/2020 1:39 AM

Workstation Name: [id number]

## 2020-06-14 NOTE — EMERGENCY DEPARTMENT REPORT
ED General Adult HPI





- General


Chief complaint: Sickle Cell Crisis


Stated complaint: SICKLE CELL PAIN


Time Seen by Provider: 06/14/20 00:57


Source: patient


Mode of arrival: Ambulatory


Limitations: No Limitations





- History of Present Illness


Initial comments: 





30-year-old male with history of sickle cell presents to ED with pain crisis.  

Patient reports chest pain, back pain x3 days.  Patient states he has been 

taking morphine and oxycodone at home without relief.  Patient reports he has 

experienced chest and back pain with past crises and the pain feels the same 

today.  He reports a low-grade temperature of 100.4 earlier today.  Patient is 

chronically on 2 L O2 at home, but states he had to increase to 3 L.  Patient 

denies any cough, sore throat, nasal congestion, loss of smell or taste, known 

exposure to anyone who has tested positive for COVID-19.


-: days(s) (3)


Location: chest, back


Radiation: distal


Severity scale (0 -10): 10


Quality: aching


Consistency: constant


Improves with: none


Worsens with: none


Associated Symptoms: chest pain, fever/chills.  denies: cough, nausea/vomiting





- Related Data


                                Home Medications











 Medication  Instructions  Recorded  Confirmed  Last Taken


 


Folic Acid [Folvite] 1 mg PO DAILY 06/21/18 06/14/20 06/13/20


 


Morphine ER [Ms Contin ER] 30 mg PO BID 06/21/18 06/14/20 06/13/20


 


Zolpidem [Ambien] 10 mg PO QHS PRN 08/12/19 06/14/20 06/12/20








                                  Previous Rx's











 Medication  Instructions  Recorded  Last Taken  Type


 


Hydroxyurea [Droxia] 1,500 mg PO DAILY #30 capsule 02/24/18 06/13/20 Rx


 


Oxycodone HCl/Acetaminophen 1 tab PO Q4-6H PRN #6 tablet 05/05/19 06/13/20 Rx





[Percocet 10/325 mg]    


 


Apixaban [Eliquis] 2.5 mg PO BID #60 tablet 06/10/19 06/13/20 Rx


 


Albuterol INH(or & Nicu Only) 2 puff IH QID PRN #1 inhalation 01/02/20 Unknown 

Rx





[ProAir HFA Inhaler]    











                                    Allergies











Allergy/AdvReac Type Severity Reaction Status Date / Time


 


No Known Allergies Allergy   Verified 02/27/20 08:49














ED Review of Systems


ROS: 


Stated complaint: SICKLE CELL PAIN


Other details as noted in HPI





Comment: All other systems reviewed and negative


Constitutional: fever


ENT: denies: throat pain, congestion


Respiratory: denies: cough


Cardiovascular: chest pain


Gastrointestinal: denies: vomiting, diarrhea


Musculoskeletal: back pain





ED Past Medical Hx





- Past Medical History


Previous Medical History?: Yes


Hx Hypertension: No


Hx CVA: No


Hx Heart Attack/AMI: No


Hx Congestive Heart Failure: No


Hx Diabetes: No


Hx Deep Vein Thrombosis: No


Hx Pulmonary Embolism: No


Hx GERD: No


Hx Liver Disease: No


Hx Renal Disease: No


Hx Sickle Cell Disease: Yes


Hx Arthritis: No


Hx Headaches / Migraines: No


Hx Seizures: No


Hx Kidney Stones: No


Hx Psychiatric Treatment: No


Hx Asthma: Yes (denies hx exacerbations)


Hx COPD: No


Hx Tuberculosis: No


Hx Dementia: No


Hx HIV: No


Additional medical history: Acute chest syndrome,





- Surgical History


Past Surgical History?: Yes


Hx Coronary Stent: No


Hx Open Heart Surgery: No


Hx Pacemaker: No


Hx Internal Defibrillator: No


Hx Cholecystectomy: Yes


Hx Appendectomy: No


Hx Breast Surgery: No


Additional Surgical History: TRACHEOSTOMY.  2 PORTS AND REMOVAL.  LIVER BIOPSY 

X2





- Social History


Smoking Status: Never Smoker


Substance Use Type: None





- Medications


Home Medications: 


                                Home Medications











 Medication  Instructions  Recorded  Confirmed  Last Taken  Type


 


Hydroxyurea [Droxia] 1,500 mg PO DAILY #30 capsule 02/24/18 06/14/20 06/13/20 Rx


 


Folic Acid [Folvite] 1 mg PO DAILY 06/21/18 06/14/20 06/13/20 History


 


Morphine ER [Ms Contin ER] 30 mg PO BID 06/21/18 06/14/20 06/13/20 History


 


Oxycodone HCl/Acetaminophen 1 tab PO Q4-6H PRN #6 tablet 05/05/19 06/14/20 06/13/20 Rx





[Percocet 10/325 mg]     


 


Apixaban [Eliquis] 2.5 mg PO BID #60 tablet 06/10/19 06/14/20 06/13/20 Rx


 


Zolpidem [Ambien] 10 mg PO QHS PRN 08/12/19 06/14/20 06/12/20 History


 


Albuterol INH(or & Nicu Only) 2 puff IH QID PRN #1 inhalation 01/02/20 06/14/20 

Unknown Rx





[ProAir HFA Inhaler]     














ED Physical Exam





- General


Limitations: No Limitations


General appearance: alert, in no apparent distress





- Head


Head exam: Present: atraumatic, normocephalic





- Eye


Eye exam: Present: EOMI, scleral icterus





- ENT


ENT exam: Present: mucous membranes moist





- Neck


Neck exam: Present: normal inspection





- Respiratory


Respiratory exam: Present: normal lung sounds bilaterally.  Absent: respiratory 

distress





- Cardiovascular


Cardiovascular Exam: Present: normal rhythm, tachycardia





- GI/Abdominal


GI/Abdominal exam: Present: soft.  Absent: distended, tenderness





- Extremities Exam


Extremities exam: Present: normal inspection





- Neurological Exam


Neurological exam: Present: alert, oriented X3





- Psychiatric


Psychiatric exam: Present: normal affect, normal mood





- Skin


Skin exam: Present: warm, dry, intact, normal color





ED Course


                                   Vital Signs











  06/14/20 06/14/20 06/14/20





  00:23 00:29 01:02


 


Temperature 99.9 F H 99.9 F H 


 


Pulse Rate 114 H 114 H 91 H


 


Respiratory 20 18 32 H





Rate   


 


Blood Pressure 118/64  


 


Blood Pressure  118/64 





[Left]   


 


O2 Sat by Pulse  92 90





Oximetry   














  06/14/20 06/14/20 06/14/20





  01:15 01:30 01:45


 


Temperature   


 


Pulse Rate 89 95 H 88


 


Respiratory 30 H 14 16





Rate   


 


Blood Pressure 109/60 111/62 105/55


 


Blood Pressure   





[Left]   


 


O2 Sat by Pulse 97 95 95





Oximetry   














  06/14/20 06/14/20 06/14/20





  01:50 02:00 02:16


 


Temperature 98.8 F  


 


Pulse Rate 82 83 81


 


Respiratory 18 27 H 27 H





Rate   


 


Blood Pressure  113/62 136/58


 


Blood Pressure 105/55  





[Left]   


 


O2 Sat by Pulse 93 86 83 L





Oximetry   














  06/14/20 06/14/20 06/14/20





  02:30 02:45 03:00


 


Temperature   


 


Pulse Rate 81 78 76


 


Respiratory 19 17 24





Rate   


 


Blood Pressure 114/61 114/63 110/64


 


Blood Pressure   





[Left]   


 


O2 Sat by Pulse 93  91





Oximetry   














  06/14/20 06/14/20 06/14/20





  03:15 03:30 03:41


 


Temperature   


 


Pulse Rate 75 75 


 


Respiratory 18 14 20





Rate   


 


Blood Pressure 115/64 109/56 


 


Blood Pressure   





[Left]   


 


O2 Sat by Pulse 90 90 94





Oximetry   














  06/14/20 06/14/20 06/14/20





  03:45 04:00 04:15


 


Temperature   


 


Pulse Rate 77  


 


Respiratory 14  





Rate   


 


Blood Pressure 117/53 130/73 103/41


 


Blood Pressure   





[Left]   


 


O2 Sat by Pulse 90 92 92





Oximetry   














  06/14/20 06/14/20 06/14/20





  04:30 04:46 05:00


 


Temperature   


 


Pulse Rate   


 


Respiratory   





Rate   


 


Blood Pressure 112/46 95/53 107/60


 


Blood Pressure   





[Left]   


 


O2 Sat by Pulse 97 93 96





Oximetry   














  06/14/20 06/14/20 06/14/20





  05:15 05:30 05:45


 


Temperature   


 


Pulse Rate   


 


Respiratory   





Rate   


 


Blood Pressure 104/47 109/61 102/50


 


Blood Pressure   





[Left]   


 


O2 Sat by Pulse 98 97 95





Oximetry   














  06/14/20 06/14/20 06/14/20





  06:00 06:15 06:30


 


Temperature   


 


Pulse Rate   


 


Respiratory   





Rate   


 


Blood Pressure 104/56 106/61 100/53


 


Blood Pressure   





[Left]   


 


O2 Sat by Pulse 98 96 99





Oximetry   














  06/14/20 06/14/20 06/14/20





  06:40 06:50 07:00


 


Temperature   


 


Pulse Rate   66


 


Respiratory   11 L





Rate   


 


Blood Pressure 100/53 95/54 104/54


 


Blood Pressure   





[Left]   


 


O2 Sat by Pulse 100 100 99





Oximetry   














ED Medical Decision Making





- Lab Data


Result diagrams: 


                                 06/15/20 05:00





                                 06/14/20 01:32





- EKG Data


-: EKG Interpreted by Me


EKG shows normal: sinus rhythm, axis, intervals, QRS complexes, ST-T waves


Rate: normal





- EKG Data


Interpretation: no acute changes





- Radiology Data


Radiology results: report reviewed, image reviewed





- Medical Decision Making





- Hb 6.3, retic count 20


- CXR negative


- EKG, troponin unremarkable


- will admit to Dr Castillo for sickle cell crisis





- Differential Diagnosis


acute chest, sickle crisis


Critical care attestation.: 


If time is entered above; I have spent that time in minutes in the direct care 

of this critically ill patient, excluding procedure time.








ED Disposition


Clinical Impression: 


 Acute sickle cell crisis





Disposition: DC-09 OP ADMIT IP TO THIS HOSP


Is pt being admited?: Yes


Condition: Stable


Time of Disposition: 03:39

## 2020-06-14 NOTE — DISCHARGE SUMMARY
Providers





- Providers


Date of Admission: 


06/14/20 05:04





Date of discharge: 06/15/20 (D/C home in am at 7am.)


Attending physician: 


DARLENE KIM





Primary care physician: 


PRIMARY CARE MD








Hospitalization


Reason for admission: SCD/anemia/pain crisis.


Condition: Stable


Hospital course: 





Patient admitted , for sxs of anemia, and sickle pain crisis.He is getting blood

now, and states , have to be in the court house in am ,or arrested.


Disposition: DC-01 TO HOME OR SELFCARE





- Discharge Diagnoses


(1) Acute sickle cell crisis


Status: Acute   





(2) Dehydration


Status: Acute   





(3) Leukocytosis


Status: Acute   


Qualifiers: 


   Leukocytosis type: unspecified   Qualified Code(s): D72.829 - Elevated white 

blood cell count, unspecified   





(4) Anemia


Status: Chronic   





(5) Hypoxia


Status: Chronic   





Core Measure Documentation





- Palliative Care


Palliative Care/ Comfort Measures: Not Applicable





- Core Measures


Any of the following diagnoses?: history only





Exam





- Constitutional


Vitals: 


                                        











Temp Pulse Resp BP Pulse Ox


 


 97.4 F L  76   18   107/64   98 


 


 06/14/20 18:13  06/14/20 18:13  06/14/20 18:13  06/14/20 18:13  06/14/20 18:13











General appearance: Present: mild distress, well-nourished





- EENT


Eyes: Present: PERRL


ENT: hearing intact, clear oral mucosa





- Neck


Neck: Present: supple, normal ROM





- Respiratory


Respiratory effort: normal


Respiratory: bilateral: CTA





- Cardiovascular


Heart Sounds: Present: S1 & S2.  Absent: rub, click





- Extremities


Extremities: pulses symmetrical, No edema


Peripheral Pulses: within normal limits





- Abdominal


General gastrointestinal: Present: soft, non-tender, non-distended, normal bowel

sounds


Male genitourinary: Present: normal





- Integumentary


Integumentary: Present: clear, warm, dry





- Musculoskeletal


Musculoskeletal: gait normal, strength equal bilaterally





- Psychiatric


Psychiatric: appropriate mood/affect, intact judgment & insight





- Neurologic


Neurologic: CNII-XII intact, moves all extremities





Plan


Activity: no restrictions


Diet: regular


Follow up with: 


PRIMARY CARE,MD [Primary Care Provider] - 3-5 Days

## 2020-06-14 NOTE — HISTORY AND PHYSICAL REPORT
History of Present Illness


Date of examination: 06/14/20


Date of admission: 


06/14/20 05:04





Chief complaint: 





Sickle pain crisis/anemia./dehydration.


History of present illness: 





Patient presented to the ED , with CC of diffuse joint pain.He was not able to 

control it at home. He was admitted, for sxs management /control. His hgb very 

low , with hypoxia. He uses 2l nc oxygen at home. He will need transfusion. of 

PRBCs. He came in with elevated WBC, which may be reactive process, vs 

infection.Once stable, he will be d/c home.





Past History


Past Medical History: anemia


Social history: no significant social history


Family history: no significant family history





Medications and Allergies


                                    Allergies











Allergy/AdvReac Type Severity Reaction Status Date / Time


 


No Known Allergies Allergy   Verified 02/27/20 08:49











                                Home Medications











 Medication  Instructions  Recorded  Confirmed  Last Taken  Type


 


Hydroxyurea [Droxia] 1,500 mg PO DAILY #30 capsule 02/24/18 06/14/20 06/13/20 Rx


 


Folic Acid [Folvite] 1 mg PO DAILY 06/21/18 06/14/20 06/13/20 History


 


Morphine ER [Ms Contin ER] 30 mg PO BID 06/21/18 06/14/20 06/13/20 History


 


Oxycodone HCl/Acetaminophen 1 tab PO Q4-6H PRN #6 tablet 05/05/19 06/14/20 06/13/20 Rx





[Percocet 10/325 mg]     


 


Apixaban [Eliquis] 2.5 mg PO BID #60 tablet 06/10/19 06/14/20 06/13/20 Rx


 


Zolpidem [Ambien] 10 mg PO QHS PRN 08/12/19 06/14/20 06/12/20 History


 


Albuterol INH(or & Nicu Only) 2 puff IH QID PRN #1 inhalation 01/02/20 06/14/20 

Unknown Rx





[ProAir HFA Inhaler]     











Active Meds: 


Active Medications





Apixaban (Eliquis)  5 mg PO Q12HR DULCE; Protocol


Diphenhydramine HCl (Benadryl)  12.5 mg IV Q3H PRN


   PRN Reason: puritis


   Last Admin: 06/14/20 18:31 Dose:  12.5 mg


   Documented by: 


Hydromorphone HCl (Dilaudid)  3 mg IV Q3H PRN


   PRN Reason: Pain , Severe (7-10)


   Last Admin: 06/14/20 18:31 Dose:  3 mg


   Documented by: 


Dextrose/Sodium Chloride (D5ns 0.2%)  1,000 mls @ 250 mls/hr IV AS DIRECT DULCE


   Last Admin: 06/14/20 12:18 Dose:  250 mls/hr


   Documented by: 


Sodium Chloride (Nacl 0.9% 500 Ml)  500 mls @ 0 mls/hr IV ONCE NR


   Stop: 06/15/20 11:37











Review of Systems


Constitutional: fatigue, chronic pain


Respiratory: shortness of breath


Musculoskeletal: low back pain





Exam





- Constitutional


Vitals: 


                                        











Temp Pulse Resp BP Pulse Ox


 


 97.4 F L  76   18   107/64   98 


 


 06/14/20 18:13  06/14/20 18:13  06/14/20 18:13  06/14/20 18:13  06/14/20 18:13











General appearance: Present: mild distress, well-nourished





- EENT


Eyes: Present: PERRL


ENT: hearing intact, clear oral mucosa





- Neck


Neck: Present: supple, normal ROM





- Respiratory


Respiratory effort: normal


Respiratory: bilateral: CTA





- Cardiovascular


Heart Sounds: Present: S1 & S2.  Absent: rub, click





- Extremities


Extremities: pulses symmetrical, No edema


Peripheral Pulses: within normal limits





- Abdominal


General gastrointestinal: Present: soft, non-tender, non-distended, normal bowel

sounds


Male genitourinary: Present: deferred





- Rectal


Rectal Exam: deferred





- Integumentary


Integumentary: Present: clear, warm, dry





- Musculoskeletal


Musculoskeletal: gait normal, strength equal bilaterally





- Psychiatric


Psychiatric: appropriate mood/affect, intact judgment & insight





- Neurologic


Neurologic: CNII-XII intact, moves all extremities





HEART Score





- HEART Score


EKG: Normal


Troponin: 


                                        











Troponin T  < 0.010 ng/mL (0.00-0.029)   06/14/20  01:32    














Results





- Labs


CBC & Chem 7: 


                                 06/14/20 01:32





                                 06/14/20 01:32


Labs: 


                              Abnormal lab results











  06/14/20 06/14/20 06/14/20 Range/Units





  01:32 01:32 01:32 


 


WBC  17.3 H    (4.5-11.0)  K/mm3


 


RBC  1.72 L    (3.65-5.03)  M/mm3


 


Hgb  6.3 L    (11.8-15.2)  gm/dl


 


Hct  16.3 L*    (35.5-45.6)  %


 


MCV  95 H    (84-94)  fl


 


MCH  37 H    (28-32)  pg


 


MCHC  39 H*    (32-34)  %


 


RDW  29.4 H    (13.2-15.2)  %


 


Monocytes % (Manual)  16.0 H    (0.0-7.3)  %


 


Nucleated RBC %  6.0 H    (0.0-0.9)  %


 


Seg Neutrophils # Man  10.4 H    (1.8-7.7)  K/mm3


 


Monocytes # (Manual)  2.8 H    (0.0-0.8)  K/mm3


 


Percent Retic    20.44 H  (0.78-2.58)  %


 


Sodium   133 L   (137-145)  mmol/L


 


Potassium   5.1 H   (3.6-5.0)  mmol/L


 


Carbon Dioxide   19 L   (22-30)  mmol/L


 


BUN   21 H   (9-20)  mg/dL


 


Creatinine   0.6 L   (0.8-1.5)  mg/dL


 


Glucose   126 H   ()  mg/dL


 


Crossmatch     














  06/14/20 Range/Units





  04:00 


 


WBC   (4.5-11.0)  K/mm3


 


RBC   (3.65-5.03)  M/mm3


 


Hgb   (11.8-15.2)  gm/dl


 


Hct   (35.5-45.6)  %


 


MCV   (84-94)  fl


 


MCH   (28-32)  pg


 


MCHC   (32-34)  %


 


RDW   (13.2-15.2)  %


 


Monocytes % (Manual)   (0.0-7.3)  %


 


Nucleated RBC %   (0.0-0.9)  %


 


Seg Neutrophils # Man   (1.8-7.7)  K/mm3


 


Monocytes # (Manual)   (0.0-0.8)  K/mm3


 


Percent Retic   (0.78-2.58)  %


 


Sodium   (137-145)  mmol/L


 


Potassium   (3.6-5.0)  mmol/L


 


Carbon Dioxide   (22-30)  mmol/L


 


BUN   (9-20)  mg/dL


 


Creatinine   (0.8-1.5)  mg/dL


 


Glucose   ()  mg/dL


 


Crossmatch  See Detail  














Assessment and Plan





- Patient Problems


(1) Acute sickle cell crisis


Current Visit: Yes   Status: Acute   


Plan to address problem: 


pain control.








(2) Dehydration


Current Visit: No   Status: Acute   


Plan to address problem: 


hydration.








(3) Leukocytosis


Current Visit: No   Status: Acute   


Qualifiers: 


   Leukocytosis type: unspecified   Qualified Code(s): D72.829 - Elevated white 

blood cell count, unspecified   


Plan to address problem: 


monitor.








(4) Anemia


Current Visit: Yes   Status: Acute   


Plan to address problem: 


blood transfusion.








(5) Hypoxia


Current Visit: Yes   Status: Acute   


Plan to address problem: 


this is chronic. continue with oxygen.

## 2020-06-15 LAB
HCT VFR BLD CALC: 22.7 % (ref 35.5–45.6)
HGB BLD-MCNC: 8.5 GM/DL (ref 11.8–15.2)

## 2020-06-15 RX ADMIN — HYDROMORPHONE HYDROCHLORIDE PRN MG: 2 INJECTION, SOLUTION INTRAMUSCULAR; INTRAVENOUS; SUBCUTANEOUS at 03:48

## 2020-06-15 RX ADMIN — HYDROMORPHONE HYDROCHLORIDE PRN MG: 2 INJECTION, SOLUTION INTRAMUSCULAR; INTRAVENOUS; SUBCUTANEOUS at 17:58

## 2020-06-15 RX ADMIN — HYDROMORPHONE HYDROCHLORIDE PRN MG: 2 INJECTION, SOLUTION INTRAMUSCULAR; INTRAVENOUS; SUBCUTANEOUS at 00:42

## 2020-06-15 RX ADMIN — DEXTROSE AND SODIUM CHLORIDE SCH MLS/HR: 5; .2 INJECTION, SOLUTION INTRAVENOUS at 13:10

## 2020-06-15 RX ADMIN — MORPHINE SULFATE SCH MG: 30 TABLET, FILM COATED, EXTENDED RELEASE ORAL at 09:19

## 2020-06-15 RX ADMIN — HYDROMORPHONE HYDROCHLORIDE PRN MG: 2 INJECTION, SOLUTION INTRAMUSCULAR; INTRAVENOUS; SUBCUTANEOUS at 07:05

## 2020-06-15 RX ADMIN — DEXTROSE AND SODIUM CHLORIDE SCH MLS/HR: 5; .2 INJECTION, SOLUTION INTRAVENOUS at 08:45

## 2020-06-15 RX ADMIN — MORPHINE SULFATE SCH MG: 30 TABLET, FILM COATED, EXTENDED RELEASE ORAL at 21:06

## 2020-06-15 RX ADMIN — HYDROMORPHONE HYDROCHLORIDE PRN MG: 2 INJECTION, SOLUTION INTRAMUSCULAR; INTRAVENOUS; SUBCUTANEOUS at 10:54

## 2020-06-15 RX ADMIN — APIXABAN SCH MG: 5 TABLET, FILM COATED ORAL at 09:19

## 2020-06-15 RX ADMIN — HYDROMORPHONE HYDROCHLORIDE PRN MG: 2 INJECTION, SOLUTION INTRAMUSCULAR; INTRAVENOUS; SUBCUTANEOUS at 14:43

## 2020-06-15 RX ADMIN — APIXABAN SCH MG: 5 TABLET, FILM COATED ORAL at 21:06

## 2020-06-15 RX ADMIN — DEXTROSE AND SODIUM CHLORIDE SCH MLS/HR: 5; .2 INJECTION, SOLUTION INTRAVENOUS at 17:15

## 2020-06-15 RX ADMIN — DEXTROSE AND SODIUM CHLORIDE SCH MLS/HR: 5; .2 INJECTION, SOLUTION INTRAVENOUS at 21:06

## 2020-06-15 RX ADMIN — DEXTROSE AND SODIUM CHLORIDE SCH MLS/HR: 5; .2 INJECTION, SOLUTION INTRAVENOUS at 03:00

## 2020-06-15 RX ADMIN — HYDROMORPHONE HYDROCHLORIDE PRN MG: 2 INJECTION, SOLUTION INTRAMUSCULAR; INTRAVENOUS; SUBCUTANEOUS at 21:08

## 2020-06-15 NOTE — PROGRESS NOTE
Assessment and Plan





- Patient Problems


(1) Acute sickle cell crisis


Current Visit: Yes   Status: Acute   


Plan to address problem: 


pain control.








(2) Dehydration


Current Visit: No   Status: Acute   


Plan to address problem: 


hydration.








(3) Leukocytosis


Current Visit: No   Status: Acute   


Qualifiers: 


   Leukocytosis type: unspecified   Qualified Code(s): D72.829 - Elevated white 

blood cell count, unspecified   


Plan to address problem: 


monitor.








(4) Anemia


Current Visit: Yes   Status: Chronic   


Plan to address problem: 


blood transfusion.








(5) Hypoxia


Current Visit: Yes   Status: Chronic   


Plan to address problem: 


this is chronic. continue with oxygen.








Subjective


Date of service: 06/15/20


Interval history: 


Patient seen/examined, resting in bed, he was notified this am, by the court, he

did not have to go in. Pain 6-7/10. Case d/w him. will be planning a final d/c 

home in 1-2 days.








Objective





- Constitutional


Vitals: 


                               Vital Signs - 12hr











  06/15/20 06/15/20 06/15/20





  13:36 17:41 21:06


 


Temperature 97.8 F 97.4 F L 


 


Pulse Rate 75 76 


 


Respiratory 18 18 18





Rate   


 


Blood Pressure 113/65 105/55 


 


O2 Sat by Pulse 97 99 





Oximetry   














  06/15/20





  21:08


 


Temperature 


 


Pulse Rate 


 


Respiratory 18





Rate 


 


Blood Pressure 


 


O2 Sat by Pulse 





Oximetry 











General appearance: Present: mild distress, well-nourished





- EENT


Eyes: PERRL, EOM intact


ENT: hearing intact, clear oral mucosa


Ears: bilateral: normal





- Neck


Neck: supple, normal ROM





- Respiratory


Respiratory effort: normal


Respiratory: bilateral: CTA





- Breasts


Breasts: deferred





- Cardiovascular


Rhythm: regular


Heart Sounds: Present: S1 & S2.  Absent: gallop, rub


Extremities: pulses intact, No edema, normal color, Full ROM





- Gastrointestinal


General gastrointestinal: Present: soft, non-tender, non-distended, normal bowel

sounds


Rectal Exam: deferred





- Genitourinary


Male genitourinary: deferred





- Integumentary


Integumentary: clear, warm, dry





- Musculoskeletal


Musculoskeletal: 1, strength equal bilaterally





- Neurologic


Neurologic: moves all extremities





- Psychiatric


Psychiatric: memory intact, appropriate mood/affect, intact judgment & insight





- Labs


CBC & Chem 7: 


                                 06/15/20 05:00





                                 06/14/20 01:32


Labs: 


                              Abnormal lab results











  06/14/20 06/15/20 Range/Units





  04:00 05:00 


 


Hgb   8.5 L  (11.8-15.2)  gm/dl


 


Hct   22.7 L D  (35.5-45.6)  %


 


Crossmatch  See Detail   














HEART Score





- HEART Score


EKG: Normal


Troponin: 


                                        











Troponin T  < 0.010 ng/mL (0.00-0.029)   06/14/20  01:32

## 2020-06-16 RX ADMIN — DEXTROSE AND SODIUM CHLORIDE SCH MLS/HR: 5; .2 INJECTION, SOLUTION INTRAVENOUS at 05:04

## 2020-06-16 RX ADMIN — HYDROMORPHONE HYDROCHLORIDE PRN MG: 2 INJECTION, SOLUTION INTRAMUSCULAR; INTRAVENOUS; SUBCUTANEOUS at 16:06

## 2020-06-16 RX ADMIN — DEXTROSE AND SODIUM CHLORIDE SCH MLS/HR: 5; .2 INJECTION, SOLUTION INTRAVENOUS at 16:10

## 2020-06-16 RX ADMIN — HYDROMORPHONE HYDROCHLORIDE PRN MG: 2 INJECTION, SOLUTION INTRAMUSCULAR; INTRAVENOUS; SUBCUTANEOUS at 08:29

## 2020-06-16 RX ADMIN — HYDROMORPHONE HYDROCHLORIDE PRN MG: 2 INJECTION, SOLUTION INTRAMUSCULAR; INTRAVENOUS; SUBCUTANEOUS at 05:04

## 2020-06-16 RX ADMIN — HYDROMORPHONE HYDROCHLORIDE PRN MG: 2 INJECTION, SOLUTION INTRAMUSCULAR; INTRAVENOUS; SUBCUTANEOUS at 20:11

## 2020-06-16 RX ADMIN — MORPHINE SULFATE SCH MG: 30 TABLET, FILM COATED, EXTENDED RELEASE ORAL at 22:03

## 2020-06-16 RX ADMIN — MORPHINE SULFATE SCH MG: 30 TABLET, FILM COATED, EXTENDED RELEASE ORAL at 10:00

## 2020-06-16 RX ADMIN — APIXABAN SCH MG: 5 TABLET, FILM COATED ORAL at 10:00

## 2020-06-16 RX ADMIN — DEXTROSE AND SODIUM CHLORIDE SCH MLS/HR: 5; .2 INJECTION, SOLUTION INTRAVENOUS at 20:11

## 2020-06-16 RX ADMIN — APIXABAN SCH MG: 5 TABLET, FILM COATED ORAL at 22:02

## 2020-06-16 RX ADMIN — DEXTROSE AND SODIUM CHLORIDE SCH MLS/HR: 5; .2 INJECTION, SOLUTION INTRAVENOUS at 12:09

## 2020-06-16 RX ADMIN — DEXTROSE AND SODIUM CHLORIDE SCH MLS/HR: 5; .2 INJECTION, SOLUTION INTRAVENOUS at 01:14

## 2020-06-16 RX ADMIN — HYDROMORPHONE HYDROCHLORIDE PRN MG: 2 INJECTION, SOLUTION INTRAMUSCULAR; INTRAVENOUS; SUBCUTANEOUS at 12:09

## 2020-06-16 RX ADMIN — HYDROMORPHONE HYDROCHLORIDE PRN MG: 2 INJECTION, SOLUTION INTRAMUSCULAR; INTRAVENOUS; SUBCUTANEOUS at 01:13

## 2020-06-16 RX ADMIN — DEXTROSE AND SODIUM CHLORIDE SCH MLS/HR: 5; .2 INJECTION, SOLUTION INTRAVENOUS at 08:32

## 2020-06-16 NOTE — PROGRESS NOTE
Assessment and Plan





- Patient Problems


(1) Acute sickle cell crisis


Current Visit: Yes   Status: Acute   


Plan to address problem: 


pain control.








(2) Dehydration


Current Visit: No   Status: Acute   


Plan to address problem: 


hydration.








(3) Leukocytosis


Current Visit: No   Status: Acute   


Qualifiers: 


   Leukocytosis type: unspecified   Qualified Code(s): D72.829 - Elevated white 

blood cell count, unspecified   


Plan to address problem: 


monitor.








(4) Anemia


Current Visit: Yes   Status: Chronic   


Plan to address problem: 


blood transfusion.


completed.








(5) Hypoxia


Current Visit: Yes   Status: Chronic   


Plan to address problem: 


this is chronic. continue with oxygen.


This is chronic hypoxia, present on admission., will continue on oxygen.








Subjective


Date of service: 06/16/20


Interval history: 


Patient seen/examined, resting in bed, he was notified this am, by the court, he

did not have to go in. Pain 6-7/10. Case d/w him. will be planning a final d/c 

home in 1-2 days.


Patient seen/examined, resting in bed, records reviewed, case d/w him. pain 

6/10. I will decrease fluid to 150, and d/c him home tomorrow.





Objective





- Constitutional


Vitals: 


                               Vital Signs - 12hr











  06/16/20 06/16/20 06/16/20





  09:35 10:59 16:28


 


Temperature  98.4 F 97.8 F


 


Pulse Rate  72 79


 


Respiratory  16 18





Rate   


 


Blood Pressure  122/79 95/48


 


O2 Sat by Pulse 98 96 93





Oximetry   














  06/16/20





  20:11


 


Temperature 


 


Pulse Rate 


 


Respiratory 18





Rate 


 


Blood Pressure 


 


O2 Sat by Pulse 





Oximetry 











General appearance: Present: mild distress, well-nourished





- EENT


Eyes: PERRL, EOM intact


ENT: hearing intact, clear oral mucosa


Ears: bilateral: normal





- Neck


Neck: supple, normal ROM





- Respiratory


Respiratory effort: normal


Respiratory: bilateral: CTA





- Breasts


Breasts: deferred





- Cardiovascular


Rhythm: regular


Heart Sounds: Present: S1 & S2.  Absent: gallop, rub


Extremities: pulses intact, No edema, normal color, Full ROM





- Gastrointestinal



General gastrointestinal: Present: soft, non-tender, non-distended, normal bowel

sounds


Rectal Exam: deferred





- Genitourinary


Male genitourinary: deferred





- Integumentary


Integumentary: clear, warm, dry





- Musculoskeletal


Musculoskeletal: 1, strength equal bilaterally





- Neurologic


Neurologic: moves all extremities





- Psychiatric


Psychiatric: memory intact, appropriate mood/affect, intact judgment & insight





- Labs


CBC & Chem 7: 


                                 06/15/20 05:00





                                 06/14/20 01:32





HEART Score





- HEART Score


EKG: Normal


Troponin: 


                                        











Troponin T  < 0.010 ng/mL (0.00-0.029)   06/14/20  01:32

## 2020-06-17 VITALS — SYSTOLIC BLOOD PRESSURE: 104 MMHG | DIASTOLIC BLOOD PRESSURE: 58 MMHG

## 2020-06-17 RX ADMIN — MORPHINE SULFATE SCH MG: 30 TABLET, FILM COATED, EXTENDED RELEASE ORAL at 09:14

## 2020-06-17 RX ADMIN — HYDROMORPHONE HYDROCHLORIDE PRN MG: 2 INJECTION, SOLUTION INTRAMUSCULAR; INTRAVENOUS; SUBCUTANEOUS at 07:01

## 2020-06-17 RX ADMIN — DEXTROSE AND SODIUM CHLORIDE SCH MLS/HR: 5; .2 INJECTION, SOLUTION INTRAVENOUS at 03:41

## 2020-06-17 RX ADMIN — HYDROMORPHONE HYDROCHLORIDE PRN MG: 2 INJECTION, SOLUTION INTRAMUSCULAR; INTRAVENOUS; SUBCUTANEOUS at 00:03

## 2020-06-17 RX ADMIN — HYDROMORPHONE HYDROCHLORIDE PRN MG: 2 INJECTION, SOLUTION INTRAMUSCULAR; INTRAVENOUS; SUBCUTANEOUS at 03:38

## 2020-06-17 RX ADMIN — APIXABAN SCH MG: 5 TABLET, FILM COATED ORAL at 09:16

## 2024-06-11 NOTE — DISCHARGE SUMMARY
Providers





- Providers


Date of Admission: 


11/10/19 06:44





Date of discharge: 11/14/19


Attending physician: 


DARLENE KIM





Primary care physician: 


PRIMARY CARE MD








Hospitalization


Condition: Stable


Hospital course: 


Patient seen/examined, resting in bed, records reviewed, case d/w patient. C/o 

pain 6-7/10, at this time. Cough better since IV ABX.Will continue current mackenzie

gement, and d/c home tomorrow.Patient had presented to the ER ,with diffuse body

ache, unable to control his sxs at home. He was admitted to the hospital, 

hydrated, transfused with PRBC, and responded quite well. He had cough, with 

greenish sputum, treated with IV ABX. He will f/up with me ,in the office in 

1week.





Disposition: DC-01 TO HOME OR SELFCARE





- Discharge Diagnoses


(1) Sickle cell crisis


Status: Resolved   





(2) Sickle cell anemia


Status: Chronic   





(3) Dehydration


Status: Resolved   





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


Status: Chronic   





Core Measure Documentation





- Palliative Care


Palliative Care/ Comfort Measures: Not Applicable





- Core Measures


Any of the following diagnoses?: history only





Exam





- Constitutional


Vitals: 


                                        











Temp Pulse Resp BP Pulse Ox


 


 99.4 F   91 H  20   126/78   99 


 


 11/13/19 17:05  11/13/19 19:21  11/13/19 19:21  11/13/19 17:05  11/13/19 19:23











General appearance: Present: mild distress, well-nourished





- EENT


Eyes: Present: PERRL


ENT: hearing intact, clear oral mucosa





- Neck


Neck: Present: supple, normal ROM





- Respiratory


Respiratory effort: normal


Respiratory: bilateral: CTA





- Cardiovascular


Heart Sounds: Present: S1 & S2.  Absent: rub, click





- Extremities


Extremities: pulses symmetrical, No edema


Peripheral Pulses: within normal limits





- Abdominal


General gastrointestinal: Present: soft, non-tender, non-distended, normal bowel

sounds


Male genitourinary: Present: deferred





- Rectal


Rectal Exam: deferred





- Integumentary


Integumentary: Present: clear, warm, dry





- Musculoskeletal


Musculoskeletal: gait normal, strength equal bilaterally





- Psychiatric


Psychiatric: appropriate mood/affect, intact judgment & insight





- Neurologic


Neurologic: CNII-XII intact, moves all extremities





Plan


Activity: no restrictions


Diet: regular


Follow up with: 


PRIMARY CARE,MD [Primary Care Provider] - 7 Days


DARLENE KIM DO [Staff Physician] - 7 Days Respiratory unable to place new artline.